# Patient Record
Sex: MALE | Race: WHITE | NOT HISPANIC OR LATINO | URBAN - METROPOLITAN AREA
[De-identification: names, ages, dates, MRNs, and addresses within clinical notes are randomized per-mention and may not be internally consistent; named-entity substitution may affect disease eponyms.]

---

## 2017-06-15 ENCOUNTER — TELEPHONE (OUTPATIENT)
Dept: FAMILY MEDICINE CLINIC | Facility: CLINIC | Age: 47
End: 2017-06-15

## 2017-06-15 NOTE — TELEPHONE ENCOUNTER
----- Message from Nancy Quiroz sent at 6/15/2017 10:43 AM EDT -----  Contact: PATIENT  HE GOT A SAMPLE PACK OF TESTOSTERONE BOOSTER IN THE MAIL ALPHA X BOOST, HE WANTED TO KNOW IF HE'S OK TO TAKE IT WITH THE citalopram (CeleXA) 20 MG. PLEASE CALL HIM BACK ON CELL NUMBER [phone]     THANK YOU

## 2018-04-04 RX ORDER — CITALOPRAM 20 MG/1
TABLET ORAL
Qty: 30 TABLET | Refills: 9 | OUTPATIENT
Start: 2018-04-04

## 2023-10-19 ENCOUNTER — LAB (OUTPATIENT)
Dept: LAB | Facility: HOSPITAL | Age: 53
End: 2023-10-19
Payer: COMMERCIAL

## 2023-10-19 ENCOUNTER — OFFICE VISIT (OUTPATIENT)
Dept: FAMILY MEDICINE CLINIC | Facility: CLINIC | Age: 53
End: 2023-10-19
Payer: COMMERCIAL

## 2023-10-19 VITALS
BODY MASS INDEX: 29.54 KG/M2 | OXYGEN SATURATION: 98 % | HEART RATE: 98 BPM | SYSTOLIC BLOOD PRESSURE: 122 MMHG | DIASTOLIC BLOOD PRESSURE: 80 MMHG | WEIGHT: 211 LBS | HEIGHT: 71 IN

## 2023-10-19 DIAGNOSIS — Z76.89 ENCOUNTER TO ESTABLISH CARE: Primary | ICD-10-CM

## 2023-10-19 DIAGNOSIS — Z13.0 SCREENING FOR DEFICIENCY ANEMIA: ICD-10-CM

## 2023-10-19 DIAGNOSIS — F32.A DEPRESSION, UNSPECIFIED DEPRESSION TYPE: ICD-10-CM

## 2023-10-19 DIAGNOSIS — Z12.5 SCREENING FOR MALIGNANT NEOPLASM OF PROSTATE: ICD-10-CM

## 2023-10-19 DIAGNOSIS — R06.09 DYSPNEA ON EXERTION: ICD-10-CM

## 2023-10-19 DIAGNOSIS — Z11.4 SCREENING FOR HIV (HUMAN IMMUNODEFICIENCY VIRUS): ICD-10-CM

## 2023-10-19 DIAGNOSIS — I51.7 ENLARGED HEART: ICD-10-CM

## 2023-10-19 DIAGNOSIS — Z13.220 SCREENING FOR LIPID DISORDERS: ICD-10-CM

## 2023-10-19 DIAGNOSIS — Z11.59 ENCOUNTER FOR HCV SCREENING TEST FOR LOW RISK PATIENT: ICD-10-CM

## 2023-10-19 DIAGNOSIS — I20.89 ANGINA OF EFFORT: ICD-10-CM

## 2023-10-19 LAB
ALBUMIN SERPL-MCNC: 3.9 G/DL (ref 3.5–5.2)
ALBUMIN/GLOB SERPL: 1.7 G/DL
ALP SERPL-CCNC: 66 U/L (ref 39–117)
ALT SERPL W P-5'-P-CCNC: 66 U/L (ref 1–41)
ANION GAP SERPL CALCULATED.3IONS-SCNC: 9.5 MMOL/L (ref 5–15)
AST SERPL-CCNC: 41 U/L (ref 1–40)
BILIRUB SERPL-MCNC: 2.3 MG/DL (ref 0–1.2)
BUN SERPL-MCNC: 18 MG/DL (ref 6–20)
BUN/CREAT SERPL: 11.9 (ref 7–25)
CALCIUM SPEC-SCNC: 9 MG/DL (ref 8.6–10.5)
CHLORIDE SERPL-SCNC: 106 MMOL/L (ref 98–107)
CO2 SERPL-SCNC: 23.5 MMOL/L (ref 22–29)
CREAT SERPL-MCNC: 1.51 MG/DL (ref 0.76–1.27)
DEPRECATED RDW RBC AUTO: 40.3 FL (ref 37–54)
EGFRCR SERPLBLD CKD-EPI 2021: 55.9 ML/MIN/1.73
ERYTHROCYTE [DISTWIDTH] IN BLOOD BY AUTOMATED COUNT: 12.1 % (ref 12.3–15.4)
GLOBULIN UR ELPH-MCNC: 2.3 GM/DL
GLUCOSE SERPL-MCNC: 157 MG/DL (ref 65–99)
HCT VFR BLD AUTO: 47.8 % (ref 37.5–51)
HGB BLD-MCNC: 16.6 G/DL (ref 13–17.7)
MCH RBC QN AUTO: 32 PG (ref 26.6–33)
MCHC RBC AUTO-ENTMCNC: 34.7 G/DL (ref 31.5–35.7)
MCV RBC AUTO: 92.1 FL (ref 79–97)
NT-PROBNP SERPL-MCNC: 2765 PG/ML (ref 0–900)
PLATELET # BLD AUTO: 229 10*3/MM3 (ref 140–450)
PMV BLD AUTO: 12 FL (ref 6–12)
POTASSIUM SERPL-SCNC: 4.7 MMOL/L (ref 3.5–5.2)
PROT SERPL-MCNC: 6.2 G/DL (ref 6–8.5)
PSA SERPL-MCNC: 1.41 NG/ML (ref 0–4)
RBC # BLD AUTO: 5.19 10*6/MM3 (ref 4.14–5.8)
SODIUM SERPL-SCNC: 139 MMOL/L (ref 136–145)
TSH SERPL DL<=0.05 MIU/L-ACNC: 2.49 UIU/ML (ref 0.27–4.2)
WBC NRBC COR # BLD: 7.73 10*3/MM3 (ref 3.4–10.8)

## 2023-10-19 PROCEDURE — 83880 ASSAY OF NATRIURETIC PEPTIDE: CPT | Performed by: STUDENT IN AN ORGANIZED HEALTH CARE EDUCATION/TRAINING PROGRAM

## 2023-10-19 PROCEDURE — 36415 COLL VENOUS BLD VENIPUNCTURE: CPT | Performed by: STUDENT IN AN ORGANIZED HEALTH CARE EDUCATION/TRAINING PROGRAM

## 2023-10-19 PROCEDURE — 86803 HEPATITIS C AB TEST: CPT | Performed by: STUDENT IN AN ORGANIZED HEALTH CARE EDUCATION/TRAINING PROGRAM

## 2023-10-19 PROCEDURE — 85027 COMPLETE CBC AUTOMATED: CPT | Performed by: STUDENT IN AN ORGANIZED HEALTH CARE EDUCATION/TRAINING PROGRAM

## 2023-10-19 PROCEDURE — 80053 COMPREHEN METABOLIC PANEL: CPT | Performed by: STUDENT IN AN ORGANIZED HEALTH CARE EDUCATION/TRAINING PROGRAM

## 2023-10-19 PROCEDURE — 84443 ASSAY THYROID STIM HORMONE: CPT | Performed by: STUDENT IN AN ORGANIZED HEALTH CARE EDUCATION/TRAINING PROGRAM

## 2023-10-19 PROCEDURE — G0103 PSA SCREENING: HCPCS | Performed by: STUDENT IN AN ORGANIZED HEALTH CARE EDUCATION/TRAINING PROGRAM

## 2023-10-19 PROCEDURE — G0432 EIA HIV-1/HIV-2 SCREEN: HCPCS | Performed by: STUDENT IN AN ORGANIZED HEALTH CARE EDUCATION/TRAINING PROGRAM

## 2023-10-19 RX ORDER — CITALOPRAM 20 MG/1
20 TABLET ORAL DAILY
Qty: 30 TABLET | Refills: 2 | Status: SHIPPED | OUTPATIENT
Start: 2023-10-19

## 2023-10-19 NOTE — PROGRESS NOTES
New Patient Office Visit      Date: 10/19/2023   Patient Name: Bj Pichardo  : 1970   MRN: [id number]     Chief Complaint:    Chief Complaint   Patient presents with    Shortness of Breath     Symptoms have being going on for about 2.5 weeks, having trouble sleeping        History of Present Illness: Bj Pichardo is a 52 y.o. male who is here today to establish care.      Subjective      HPI:  Patient presents today to establish care.  Has not seen a physician in several years.    Started feeling SOB about a month ago.  Thought it was a cold at first, started taking Theraflu. Symptoms then started progressing, now feels SOB and experiencing chest tightness with walking anywhere form 30 to 50 feet. He is fairly active with this work. He denies any cough, fever. He was seen at Urgent Care on 10/16 and treated for URI with Amoxicillin and albuterol. CXR at that time showed no airspace disease but there was concern for enlarged heart, no pleural effusions were noted.   He denies any known cardiac history. He had been feeling overall in good health up until the last month. He does admit to extensive alcohol use on the weekends starting during pandemic but denies daily drinking or any h/o alcohol withdrawal. States he knew he was drinking too much so he cut back and has not had any alcohol in over 2 months. Denies LE swelling, palpitations, syncopal episode. No h/o smoking.     Would also like to discuss depression. Was started on Celexa a few years ago at time of his second divorce and recalls this helping. Stopped taking it because he thought he was in a better place. He now feels low motivation, low energy. Not finding any interest in things.     Review of Systems:   Negative/not pertinent unless otherwise noted above in HPI.     Past Medical History:   Past Medical History:   Diagnosis Date    Kidney stone        Past Surgical History: History reviewed. No pertinent surgical history.    Family History:    Family History   Problem Relation Age of Onset    Arthritis Mother     Arthritis Father     Heart attack Father     Ankylosing spondylitis Brother        Social History:   Social History     Socioeconomic History    Marital status: Single   Tobacco Use    Smoking status: Never     Passive exposure: Never    Smokeless tobacco: Never   Vaping Use    Vaping Use: Never used   Substance and Sexual Activity    Alcohol use: Not Currently    Drug use: Not Currently     Types: Marijuana     Comment: Rare    Sexual activity: Defer       Medications:     Current Outpatient Medications:     albuterol sulfate  (90 Base) MCG/ACT inhaler, Inhale 2 puffs Every 4 (Four) Hours As Needed for Wheezing for up to 30 days., Disp: 6.7 g, Rfl: 0    amoxicillin (AMOXIL) 500 MG capsule, Take 2 capsules by mouth 2 (Two) Times a Day for 10 days., Disp: 40 capsule, Rfl: 0    hydrOXYzine (ATARAX) 25 MG tablet, Take 1 tablet by mouth 3 (Three) Times a Day As Needed for Anxiety., Disp: 20 tablet, Rfl: 0    citalopram (CeleXA) 20 MG tablet, Take 1 tablet by mouth Daily., Disp: 30 tablet, Rfl: 2    Allergies:   No Known Allergies    Immunizations:  Immunization History   Administered Date(s) Administered    COVID-19 (PFIZER) Purple Cap Monovalent 03/01/2021, 03/29/2021     Hep C (Age 18-79 once):  ordered  HIV (Age 15-65 once): ordered  A1c: ordered  Lipid panel: plan for follow up    Tobacco Use: Low Risk  (10/19/2023)    Patient History     Smoking Tobacco Use: Never     Smokeless Tobacco Use: Never     Passive Exposure: Never       Social History     Substance and Sexual Activity   Alcohol Use Not Currently        Social History     Substance and Sexual Activity   Drug Use Not Currently    Types: Marijuana    Comment: Rare        Diet/Physical activity: Not healthy, attempting to improve. counseled on 10/20/23    PHQ-2 Depression Screening  Little interest or pleasure in doing things? 0-->not at all   Feeling down, depressed, or hopeless?  "1-->several days   PHQ-2 Total Score 1     PHQ-9 Total Score: 1       Objective     Physical Exam:  Vital Signs:   Vitals:    10/19/23 1121   BP: 122/80   BP Location: Left arm   Patient Position: Sitting   Cuff Size: Adult   Pulse: 98   SpO2: 98%   Weight: 95.7 kg (211 lb)   Height: 180.3 cm (71\")     Body mass index is 29.43 kg/m².    Physical Exam  Vitals reviewed.   Constitutional:       General: He is not in acute distress.     Appearance: Normal appearance. He is normal weight.   HENT:      Head: Normocephalic and atraumatic.      Right Ear: Tympanic membrane normal.      Left Ear: Tympanic membrane normal.      Nose: Nose normal.      Mouth/Throat:      Mouth: Mucous membranes are moist.      Pharynx: Oropharynx is clear. No oropharyngeal exudate or posterior oropharyngeal erythema.   Eyes:      Extraocular Movements: Extraocular movements intact.      Conjunctiva/sclera: Conjunctivae normal.      Pupils: Pupils are equal, round, and reactive to light.   Cardiovascular:      Rate and Rhythm: Normal rate and regular rhythm.      Pulses: Normal pulses.      Heart sounds: Normal heart sounds. No murmur heard.  Pulmonary:      Effort: Pulmonary effort is normal.      Breath sounds: No wheezing, rhonchi or rales.   Abdominal:      General: Abdomen is flat. Bowel sounds are normal.      Palpations: Abdomen is soft. There is no mass.      Tenderness: There is no abdominal tenderness.      Hernia: A hernia (umbilical) is present.   Musculoskeletal:         General: Normal range of motion.      Cervical back: Normal range of motion and neck supple.      Right lower leg: No edema.      Left lower leg: No edema.   Lymphadenopathy:      Cervical: No cervical adenopathy.   Neurological:      General: No focal deficit present.      Mental Status: He is alert.   Psychiatric:         Mood and Affect: Mood normal.         Thought Content: Thought content normal.         Procedures  EKG- Shows inferior lead and septal Q waves " in comparison to last available EKG 2016      Assessment / Plan      Assessment/Plan:   Diagnoses and all orders for this visit:    1. Encounter to establish care (Primary)  -     CBC (No Diff); Future  -     Comprehensive Metabolic Panel; Future  -     TSH; Future  -     CBC (No Diff)  -     Comprehensive Metabolic Panel  -     TSH    2. Dyspnea on exertion    3. Angina of effort  -     Stress Test With Myocardial Perfusion One Day; Future  -     Ambulatory Referral to Cardiology    4. Enlarged heart  -     CBC (No Diff); Future  -     Comprehensive Metabolic Panel; Future  -     TSH; Future  -     proBNP; Future  -     Adult Transthoracic Echo Complete W/ Cont if Necessary Per Protocol; Future  -     ECG 12 Lead  -     CBC (No Diff)  -     Comprehensive Metabolic Panel  -     TSH  -     proBNP  -     Stress Test With Myocardial Perfusion One Day; Future  -     Ambulatory Referral to Cardiology    5. Depression, unspecified depression type  -     citalopram (CeleXA) 20 MG tablet; Take 1 tablet by mouth Daily.  Dispense: 30 tablet; Refill: 2    6. Screening for malignant neoplasm of prostate  -     PSA Screen; Future  -     PSA Screen    7. Screening for lipid disorders    8. Screening for deficiency anemia  -     CBC (No Diff); Future  -     CBC (No Diff)    9. Encounter for HCV screening test for low risk patient  -     Hepatitis C Antibody; Future  -     Hepatitis C Antibody    10. Screening for HIV (human immunodeficiency virus)  -     HIV-1 / O / 2 Ag / Antibody; Future  -     HIV-1 / O / 2 Ag / Antibody      Dyspnea/Chest Tightness on Exertion  Symptoms x 1 month c/w possible NYHA Class 2 HF, stable angina on exertion. There is evidence of cardiomegaly on recent Xray. Difficult to tell whether his symptoms are ischemic in nature or 2/2 cardiomyopathy. He denies any symptoms at rest. There is notable Q waves in inferior and septal leads. Will pursue further evaluation with echo and stress testing. Urgent  referral to Cardiology placed.  BNP resulted on 10/20 elevated at 2,000 (unknown baseline). Will start daily Lasix 40mg and ASA 81mg.   Patient was counseled extensively on reasons to report to ER if symptoms worsen or begin happening with rest.   Plan for fasting lipid panel at follow up    Depression  Start Celexa 20mg daily  Recheck at FU    Healthcare Maintenance:  Counseling provided based on age appropriate USPSTF guidelines.  BMI is >= 25 and <30. (Overweight) The following options were offered after discussion;: exercise counseling/recommendations and nutrition counseling/recommendations    Bj Marino voices understanding and acceptance of this advice and will call back with any further questions or concerns. AVS with preventive healthcare tips printed for patient.         Follow Up:   Return in about 4 weeks (around 11/16/2023) for Recheck 30 min appt w Dr. Hilton, wants to establish with him.  Has appt next week w Dr. Yobani Kern, DO  [facility]

## 2023-10-20 ENCOUNTER — PATIENT ROUNDING (BHMG ONLY) (OUTPATIENT)
Dept: FAMILY MEDICINE CLINIC | Facility: CLINIC | Age: 53
End: 2023-10-20
Payer: COMMERCIAL

## 2023-10-20 LAB
HCV AB SER DONR QL: NORMAL
HIV 1+2 AB+HIV1 P24 AG SERPL QL IA: NORMAL

## 2023-10-20 RX ORDER — FUROSEMIDE 40 MG/1
40 TABLET ORAL DAILY
Qty: 10 TABLET | Refills: 0 | Status: SHIPPED | OUTPATIENT
Start: 2023-10-20

## 2023-10-23 ENCOUNTER — PATIENT MESSAGE (OUTPATIENT)
Dept: FAMILY MEDICINE CLINIC | Facility: CLINIC | Age: 53
End: 2023-10-23
Payer: COMMERCIAL

## 2023-10-23 ENCOUNTER — TELEPHONE (OUTPATIENT)
Dept: FAMILY MEDICINE CLINIC | Facility: CLINIC | Age: 53
End: 2023-10-23
Payer: COMMERCIAL

## 2023-10-23 DIAGNOSIS — I42.0 DILATED CARDIOMYOPATHY: ICD-10-CM

## 2023-10-23 DIAGNOSIS — R06.09 DYSPNEA ON EXERTION: Primary | ICD-10-CM

## 2023-10-23 NOTE — TELEPHONE ENCOUNTER
Caller: YNES MONTGOMERY    Relationship: Emergency Contact    Best call back number: [phone]     What is the best time to reach you: ANY    Who are you requesting to speak with (clinical staff, provider,  specific staff member): REFERRAL COORDINATOR    Do you know the name of the person who called: SELF    What was the call regarding: PATIENT'S SISTER CALLED ABOUT STRESS TEST.    Is it okay if the provider responds through MyChart: NO

## 2023-10-23 NOTE — TELEPHONE ENCOUNTER
Called pt and let him know his stress test was sent to [facility]. Gave pt phone number to Central Scheduling.

## 2023-10-23 NOTE — TELEPHONE ENCOUNTER
Hub staff attempted to follow warm transfer process and was unsuccessful     Caller: Bj Pichardo    Relationship to patient: Self    Best call back number: [phone]    Patient is needing: AN UPDATE ON REFERRAL FOR CARDIOLOGIST

## 2023-10-27 ENCOUNTER — TELEPHONE (OUTPATIENT)
Dept: FAMILY MEDICINE CLINIC | Facility: CLINIC | Age: 53
End: 2023-10-27
Payer: COMMERCIAL

## 2023-10-27 ENCOUNTER — TELEPHONE (OUTPATIENT)
Dept: CARDIOLOGY | Facility: CLINIC | Age: 53
End: 2023-10-27

## 2023-10-27 ENCOUNTER — HOSPITAL ENCOUNTER (OUTPATIENT)
Dept: CARDIOLOGY | Facility: HOSPITAL | Age: 53
Discharge: HOME | End: 2023-10-27
Payer: COMMERCIAL

## 2023-10-27 VITALS — BODY MASS INDEX: 29.54 KG/M2 | HEIGHT: 71 IN | WEIGHT: 210.98 LBS

## 2023-10-27 DIAGNOSIS — I31.39 PERICARDIAL EFFUSION: ICD-10-CM

## 2023-10-27 DIAGNOSIS — I51.7 ENLARGED HEART: ICD-10-CM

## 2023-10-27 DIAGNOSIS — I50.20 HEART FAILURE WITH REDUCED EJECTION FRACTION: ICD-10-CM

## 2023-10-27 DIAGNOSIS — R06.09 DYSPNEA ON EXERTION: Primary | ICD-10-CM

## 2023-10-27 LAB
BH CV ECHO MEAS - AO ROOT DIAM: 2.8 CM
BH CV ECHO MEAS - EDV(CUBED): 361.2 ML
BH CV ECHO MEAS - EDV(MOD-SP2): 202 ML
BH CV ECHO MEAS - EDV(MOD-SP4): 209 ML
BH CV ECHO MEAS - EF(MOD-BP): 10 %
BH CV ECHO MEAS - EF(MOD-SP2): 10.4 %
BH CV ECHO MEAS - EF(MOD-SP4): 10.5 %
BH CV ECHO MEAS - ESV(CUBED): 313.4 ML
BH CV ECHO MEAS - ESV(MOD-SP2): 181 ML
BH CV ECHO MEAS - ESV(MOD-SP4): 187 ML
BH CV ECHO MEAS - FS: 4.6 %
BH CV ECHO MEAS - IVS/LVPW: 1.05 CM
BH CV ECHO MEAS - IVSD: 0.79 CM
BH CV ECHO MEAS - LAT PEAK E' VEL: 2.8 CM/SEC
BH CV ECHO MEAS - LV DIASTOLIC VOL/BSA (35-75): 96.9 CM2
BH CV ECHO MEAS - LV MASS(C)D: 241.2 GRAMS
BH CV ECHO MEAS - LV SYSTOLIC VOL/BSA (12-30): 86.7 CM2
BH CV ECHO MEAS - LVIDD: 7.1 CM
BH CV ECHO MEAS - LVIDS: 6.8 CM
BH CV ECHO MEAS - LVOT AREA: 3.1 CM2
BH CV ECHO MEAS - LVOT DIAM: 1.98 CM
BH CV ECHO MEAS - LVPWD: 0.75 CM
BH CV ECHO MEAS - MED PEAK E' VEL: 3.1 CM/SEC
BH CV ECHO MEAS - MR MAX PG: 72.1 MMHG
BH CV ECHO MEAS - MR MAX VEL: 424.3 CM/SEC
BH CV ECHO MEAS - MR MEAN PG: 44.9 MMHG
BH CV ECHO MEAS - MR MEAN VEL: 310.8 CM/SEC
BH CV ECHO MEAS - MR VTI: 120.4 CM
BH CV ECHO MEAS - MV A MAX VEL: 42 CM/SEC
BH CV ECHO MEAS - MV DEC SLOPE: 382.6 CM/SEC2
BH CV ECHO MEAS - MV DEC TIME: 0.39 SEC
BH CV ECHO MEAS - MV E MAX VEL: 63.7 CM/SEC
BH CV ECHO MEAS - MV E/A: 1.52
BH CV ECHO MEAS - MV P1/2T: 78 MSEC
BH CV ECHO MEAS - MVA(P1/2T): 2.8 CM2
BH CV ECHO MEAS - PA ACC SLOPE: 439.7 CM/SEC2
BH CV ECHO MEAS - PA ACC TIME: 0.08 SEC
BH CV ECHO MEAS - PI END-D VEL: 153.4 CM/SEC
BH CV ECHO MEAS - RAP SYSTOLE: 15 MMHG
BH CV ECHO MEAS - RVDD: 3.6 CM
BH CV ECHO MEAS - RVSP: 63.7 MMHG
BH CV ECHO MEAS - SI(MOD-SP2): 9.7 ML/M2
BH CV ECHO MEAS - SI(MOD-SP4): 10.2 ML/M2
BH CV ECHO MEAS - SV(MOD-SP2): 21 ML
BH CV ECHO MEAS - SV(MOD-SP4): 22 ML
BH CV ECHO MEAS - TAPSE (>1.6): 1.4 CM
BH CV ECHO MEAS - TR MAX PG: 48.7 MMHG
BH CV ECHO MEAS - TR MAX VEL: 348.8 CM/SEC
BH CV ECHO MEASUREMENTS AVERAGE E/E' RATIO: 21.59
BH CV VAS BP LEFT ARM: NORMAL MMHG
BH CV XLRA - RV BASE: 5.1 CM
BH CV XLRA - RV LENGTH: 8.7 CM
BH CV XLRA - RV MID: 4.2 CM
BH CV XLRA - TDI S': 5.53 CM/SEC
IVRT: 204 MS
LEFT ATRIUM VOLUME INDEX: 54.2 ML/M2
LEFT ATRIUM VOLUME: 117 ML

## 2023-10-27 PROCEDURE — 93306 TTE W/DOPPLER COMPLETE: CPT

## 2023-10-27 NOTE — TELEPHONE ENCOUNTER
Caller: Bj Pichardo    Relationship to patient: Self    Best call back number: [phone]    Chief complaint:     Type of visit: NEW PATIENT    Requested date: ASAP     If rescheduling, when is the original appointment: NONE     Additional notes:[facility] HAS NO OPENINGS TO SCHEDULE PATIENT FROM REFERRAL WITH DR. RAO. PLEASE CONTACT THE PATIENT ASAP TO SCHEDULE

## 2023-10-27 NOTE — TELEPHONE ENCOUNTER
Spoke with patient to relay results over the phone. STAT referral to Cardiology is pending, sent new referral to [facility]. Awaiting appt. Will continue lasix and start low dose Losartan. Hold off on BB for now until he is seen by Cardiology.     Pt reports that his symptoms have been much improved since starting on Lasix. Again reviewed strict ER precautions if symptoms worsen at all.

## 2023-10-31 NOTE — TELEPHONE ENCOUNTER
From: Olesya Kern  To: Bj Pichardo  Sent: 10/23/2023 2:03 PM EDT  Subject: Cardiology Appointment    Timo Lorenzo,     I hope you're doing well. I wanted to follow up and see if you had been able to schedule with the Cardiology office yet? I see your stress test was scheduled already.    Best,  Olesya Kern, DO

## 2023-11-01 ENCOUNTER — HOSPITAL ENCOUNTER (OUTPATIENT)
Dept: CARDIOLOGY | Facility: HOSPITAL | Age: 53
Discharge: HOME | End: 2023-11-01
Payer: COMMERCIAL

## 2023-11-01 DIAGNOSIS — I51.7 ENLARGED HEART: ICD-10-CM

## 2023-11-01 DIAGNOSIS — I20.89 ANGINA OF EFFORT: ICD-10-CM

## 2023-11-01 PROBLEM — I50.20 HFREF (HEART FAILURE WITH REDUCED EJECTION FRACTION): Status: ACTIVE | Noted: 2023-11-01

## 2023-11-01 PROCEDURE — 93017 CV STRESS TEST TRACING ONLY: CPT

## 2023-11-01 PROCEDURE — 78452 HT MUSCLE IMAGE SPECT MULT: CPT

## 2023-11-01 PROCEDURE — 25010000002 REGADENOSON 0.4 MG/5ML SOLUTION: Performed by: STUDENT IN AN ORGANIZED HEALTH CARE EDUCATION/TRAINING PROGRAM

## 2023-11-01 PROCEDURE — A9500 TC99M SESTAMIBI: HCPCS | Performed by: STUDENT IN AN ORGANIZED HEALTH CARE EDUCATION/TRAINING PROGRAM

## 2023-11-01 PROCEDURE — 0 TECHNETIUM SESTAMIBI: Performed by: STUDENT IN AN ORGANIZED HEALTH CARE EDUCATION/TRAINING PROGRAM

## 2023-11-01 RX ORDER — REGADENOSON 0.08 MG/ML
0.4 INJECTION, SOLUTION INTRAVENOUS ONCE
Status: COMPLETED | OUTPATIENT
Start: 2023-11-01 | End: 2023-11-01

## 2023-11-01 RX ORDER — ASPIRIN 81 MG/1
81 TABLET, CHEWABLE ORAL DAILY
COMMUNITY

## 2023-11-01 RX ADMIN — REGADENOSON 0.4 MG: 0.08 INJECTION, SOLUTION INTRAVENOUS at 09:35

## 2023-11-01 RX ADMIN — TECHNETIUM TC 99M SESTAMIBI 1 DOSE: 1 INJECTION INTRAVENOUS at 07:52

## 2023-11-01 RX ADMIN — TECHNETIUM TC 99M SESTAMIBI 1 DOSE: 1 INJECTION INTRAVENOUS at 09:35

## 2023-11-02 ENCOUNTER — TELEPHONE (OUTPATIENT)
Dept: CARDIOLOGY | Facility: CLINIC | Age: 53
End: 2023-11-02

## 2023-11-02 ENCOUNTER — OFFICE VISIT (OUTPATIENT)
Dept: CARDIOLOGY | Facility: CLINIC | Age: 53
End: 2023-11-02
Payer: COMMERCIAL

## 2023-11-02 VITALS
OXYGEN SATURATION: 99 % | HEART RATE: 76 BPM | SYSTOLIC BLOOD PRESSURE: 116 MMHG | WEIGHT: 199 LBS | BODY MASS INDEX: 27.86 KG/M2 | DIASTOLIC BLOOD PRESSURE: 78 MMHG | HEIGHT: 71 IN

## 2023-11-02 DIAGNOSIS — I51.9 SYSTOLIC DYSFUNCTION: ICD-10-CM

## 2023-11-02 DIAGNOSIS — I49.3 PVC (PREMATURE VENTRICULAR CONTRACTION): Primary | ICD-10-CM

## 2023-11-02 RX ORDER — CARVEDILOL 6.25 MG/1
6.25 TABLET ORAL 2 TIMES DAILY
Qty: 180 TABLET | Refills: 3 | Status: SHIPPED | OUTPATIENT
Start: 2023-11-02

## 2023-11-02 RX ORDER — DAPAGLIFLOZIN 10 MG/1
10 TABLET, FILM COATED ORAL DAILY
Qty: 90 TABLET | Refills: 3 | Status: SHIPPED | OUTPATIENT
Start: 2023-11-02

## 2023-11-02 NOTE — PROGRESS NOTES
"Chief Complaint  Consult      Subjective   History of Present Illness    Problem List  -compensated severe systolic dysfunction, etiology unknown but likely idiopathic dilated cardiomyopathy  -prior heavy drinking  -family hx of CAD  -EKG, frequent PVCs, echo LVEF 20%, nuclear scan pending    Mr. Pichardo is a 52 year old man with above hx.  Was a little short of breath.  CXR showed cardiomegaly and echo showed severe systolic dysfunction. Placed on diuretic and symptoms resolved.  Was drinking heavily but quit a few weeks ago.  ROS negative except for the above.           Objective   Vital Signs:  Vitals:    11/02/23 0943   BP: 116/78   Pulse: 76   SpO2: 99%     Estimated body mass index is 27.75 kg/m² as calculated from the following:    Height as of this encounter: 180.3 cm (71\").    Weight as of this encounter: 90.3 kg (199 lb).       Physical Exam  HENT:      Head: Normocephalic.   Eyes:      Extraocular Movements: Extraocular movements intact.   Cardiovascular:      Rate and Rhythm: Normal rate and regular rhythm.      Heart sounds: No murmur heard.     No gallop.   Pulmonary:      Breath sounds: Normal breath sounds.   Abdominal:      Palpations: Abdomen is soft.   Musculoskeletal:      Right lower leg: No edema.      Left lower leg: No edema.   Skin:     General: Skin is warm and dry.   Neurological:      General: No focal deficit present.      Mental Status: He is alert.   Psychiatric:         Mood and Affect: Mood normal.           ECG 12 Lead    Date/Time: 11/2/2023 11:02 AM  Performed by: Vega Yoder MD    Authorized by: Vega oYder MD  Rhythm: sinus rhythm  Ectopy: unifocal PVCs           Assessment   -compensated severe systolic dysfunction, etiology unknown but likely idiopathic dilated cardiomyopathy  -prior heavy drinking  -family hx of CAD  -EKG, frequent PVCs, echo LVEF 20%, nuclear scan pending    Plan   -on losartan and aspirin  -start coreg and farxiga  -blood work and " holter to monitor PVCs  -nuclear scan pending but will eventually need LHC/RHC or coronary CT angiogram  -1-2 week follow up      Vega Yoder MD  11/02/2023 11:02 EDT

## 2023-11-02 NOTE — TELEPHONE ENCOUNTER
Received call from patient's sister regarding price of Farxiga at pharmacy. Returned call and informed patient of patient assistance forms that will be mailed to him and will need to be returned to the office. Patient agreeable to plan and questions answered at this time.

## 2023-11-03 LAB
BH CV REST NUCLEAR ISOTOPE DOSE: 9.9 MCI
BH CV STRESS BP STAGE 2: NORMAL
BH CV STRESS BP STAGE 4: NORMAL
BH CV STRESS COMMENTS STAGE 1: NORMAL
BH CV STRESS DOSE REGADENOSON STAGE 1: 0.4
BH CV STRESS DURATION MIN STAGE 1: 1
BH CV STRESS DURATION MIN STAGE 2: 1
BH CV STRESS DURATION MIN STAGE 3: 1
BH CV STRESS DURATION MIN STAGE 4: 1
BH CV STRESS DURATION SEC STAGE 1: 0
BH CV STRESS DURATION SEC STAGE 2: 0
BH CV STRESS DURATION SEC STAGE 3: 0
BH CV STRESS DURATION SEC STAGE 4: 0
BH CV STRESS HR STAGE 1: 76
BH CV STRESS HR STAGE 2: 96
BH CV STRESS HR STAGE 3: 83
BH CV STRESS HR STAGE 4: 81
BH CV STRESS NUCLEAR ISOTOPE DOSE: 32.1 MCI
BH CV STRESS O2 STAGE 1: 99
BH CV STRESS O2 STAGE 2: 99
BH CV STRESS O2 STAGE 3: 100
BH CV STRESS O2 STAGE 4: 100
BH CV STRESS PROTOCOL 1: NORMAL
BH CV STRESS RECOVERY BP: NORMAL MMHG
BH CV STRESS RECOVERY HR: 80 BPM
BH CV STRESS RECOVERY O2: 100 %
BH CV STRESS STAGE 1: 1
BH CV STRESS STAGE 2: 2
BH CV STRESS STAGE 3: 3
BH CV STRESS STAGE 4: 4
LV EF NUC BP: 19 %
MAXIMAL PREDICTED HEART RATE: 170 BPM
PERCENT MAX PREDICTED HR: 57.65 %
STRESS BASELINE BP: NORMAL MMHG
STRESS BASELINE HR: 88 BPM
STRESS O2 SAT REST: 99 %
STRESS PERCENT HR: 68 %
STRESS POST ESTIMATED WORKLOAD: 1 METS
STRESS POST EXERCISE DUR MIN: 4 MIN
STRESS POST EXERCISE DUR SEC: 0 SEC
STRESS POST O2 SAT PEAK: 100 %
STRESS POST PEAK BP: NORMAL MMHG
STRESS POST PEAK HR: 98 BPM
STRESS TARGET HR: 145 BPM

## 2023-11-12 ENCOUNTER — APPOINTMENT (OUTPATIENT)
Dept: GENERAL RADIOLOGY | Facility: HOSPITAL | Age: 53
End: 2023-11-12
Payer: COMMERCIAL

## 2023-11-12 ENCOUNTER — HOSPITAL ENCOUNTER (EMERGENCY)
Facility: HOSPITAL | Age: 53
Discharge: HOME | End: 2023-11-12
Attending: STUDENT IN AN ORGANIZED HEALTH CARE EDUCATION/TRAINING PROGRAM | Admitting: STUDENT IN AN ORGANIZED HEALTH CARE EDUCATION/TRAINING PROGRAM
Payer: COMMERCIAL

## 2023-11-12 ENCOUNTER — APPOINTMENT (OUTPATIENT)
Dept: CT IMAGING | Facility: HOSPITAL | Age: 53
End: 2023-11-12
Payer: COMMERCIAL

## 2023-11-12 VITALS
TEMPERATURE: 98.2 F | HEART RATE: 73 BPM | HEIGHT: 71 IN | SYSTOLIC BLOOD PRESSURE: 102 MMHG | RESPIRATION RATE: 18 BRPM | WEIGHT: 199 LBS | DIASTOLIC BLOOD PRESSURE: 67 MMHG | OXYGEN SATURATION: 97 % | BODY MASS INDEX: 27.86 KG/M2

## 2023-11-12 DIAGNOSIS — I50.9 ACUTE ON CHRONIC CONGESTIVE HEART FAILURE, UNSPECIFIED HEART FAILURE TYPE: Primary | ICD-10-CM

## 2023-11-12 DIAGNOSIS — R06.00 DYSPNEA, UNSPECIFIED TYPE: ICD-10-CM

## 2023-11-12 DIAGNOSIS — I50.20 HFREF (HEART FAILURE WITH REDUCED EJECTION FRACTION): ICD-10-CM

## 2023-11-12 DIAGNOSIS — R79.89 ELEVATED BRAIN NATRIURETIC PEPTIDE (BNP) LEVEL: ICD-10-CM

## 2023-11-12 LAB
ALBUMIN SERPL-MCNC: 3.8 G/DL (ref 3.5–5.2)
ALBUMIN/GLOB SERPL: 1.8 G/DL
ALP SERPL-CCNC: 91 U/L (ref 39–117)
ALT SERPL W P-5'-P-CCNC: 50 U/L (ref 1–41)
ANION GAP SERPL CALCULATED.3IONS-SCNC: 9 MMOL/L (ref 5–15)
AST SERPL-CCNC: 30 U/L (ref 1–40)
ATMOSPHERIC PRESS: ABNORMAL MM[HG]
B PARAPERT DNA SPEC QL NAA+PROBE: NOT DETECTED
B PERT DNA SPEC QL NAA+PROBE: NOT DETECTED
BASE EXCESS BLDV CALC-SCNC: 3.7 MMOL/L (ref -2–2)
BASOPHILS # BLD AUTO: 0.04 10*3/MM3 (ref 0–0.2)
BASOPHILS NFR BLD AUTO: 0.5 % (ref 0–1.5)
BDY SITE: ABNORMAL
BILIRUB SERPL-MCNC: 1.3 MG/DL (ref 0–1.2)
BODY TEMPERATURE: 37 C
BUN SERPL-MCNC: 13 MG/DL (ref 6–20)
BUN/CREAT SERPL: 10.5 (ref 7–25)
C PNEUM DNA NPH QL NAA+NON-PROBE: NOT DETECTED
CALCIUM SPEC-SCNC: 8.8 MG/DL (ref 8.6–10.5)
CHLORIDE SERPL-SCNC: 111 MMOL/L (ref 98–107)
CO2 BLDA-SCNC: 31.6 MMOL/L (ref 22–33)
CO2 SERPL-SCNC: 23 MMOL/L (ref 22–29)
COHGB MFR BLD: 1 %
CREAT SERPL-MCNC: 1.24 MG/DL (ref 0.76–1.27)
DEPRECATED RDW RBC AUTO: 41.4 FL (ref 37–54)
EGFRCR SERPLBLD CKD-EPI 2021: 70.8 ML/MIN/1.73
EOSINOPHIL # BLD AUTO: 0.12 10*3/MM3 (ref 0–0.4)
EOSINOPHIL NFR BLD AUTO: 1.4 % (ref 0.3–6.2)
EPAP: 0
ERYTHROCYTE [DISTWIDTH] IN BLOOD BY AUTOMATED COUNT: 12 % (ref 12.3–15.4)
FLUAV SUBTYP SPEC NAA+PROBE: NOT DETECTED
FLUBV RNA ISLT QL NAA+PROBE: NOT DETECTED
GLOBULIN UR ELPH-MCNC: 2.1 GM/DL
GLUCOSE SERPL-MCNC: 94 MG/DL (ref 65–99)
HADV DNA SPEC NAA+PROBE: NOT DETECTED
HCO3 BLDV-SCNC: 30.1 MMOL/L (ref 22–28)
HCOV 229E RNA SPEC QL NAA+PROBE: NOT DETECTED
HCOV HKU1 RNA SPEC QL NAA+PROBE: NOT DETECTED
HCOV NL63 RNA SPEC QL NAA+PROBE: NOT DETECTED
HCOV OC43 RNA SPEC QL NAA+PROBE: NOT DETECTED
HCT VFR BLD AUTO: 47.7 % (ref 37.5–51)
HGB BLD-MCNC: 15.7 G/DL (ref 13–17.7)
HGB BLDA-MCNC: 16.3 G/DL (ref 13.5–17.5)
HMPV RNA NPH QL NAA+NON-PROBE: NOT DETECTED
HOLD SPECIMEN: NORMAL
HPIV1 RNA ISLT QL NAA+PROBE: NOT DETECTED
HPIV2 RNA SPEC QL NAA+PROBE: NOT DETECTED
HPIV3 RNA NPH QL NAA+PROBE: NOT DETECTED
HPIV4 P GENE NPH QL NAA+PROBE: NOT DETECTED
IMM GRANULOCYTES # BLD AUTO: 0.02 10*3/MM3 (ref 0–0.05)
IMM GRANULOCYTES NFR BLD AUTO: 0.2 % (ref 0–0.5)
INHALED O2 CONCENTRATION: 21 %
IPAP: 0
LYMPHOCYTES # BLD AUTO: 2.15 10*3/MM3 (ref 0.7–3.1)
LYMPHOCYTES NFR BLD AUTO: 24.9 % (ref 19.6–45.3)
M PNEUMO IGG SER IA-ACNC: NOT DETECTED
MCH RBC QN AUTO: 31.2 PG (ref 26.6–33)
MCHC RBC AUTO-ENTMCNC: 32.9 G/DL (ref 31.5–35.7)
MCV RBC AUTO: 94.8 FL (ref 79–97)
METHGB BLD QL: 0.4 %
MODALITY: ABNORMAL
MONOCYTES # BLD AUTO: 0.57 10*3/MM3 (ref 0.1–0.9)
MONOCYTES NFR BLD AUTO: 6.6 % (ref 5–12)
NEUTROPHILS NFR BLD AUTO: 5.73 10*3/MM3 (ref 1.7–7)
NEUTROPHILS NFR BLD AUTO: 66.4 % (ref 42.7–76)
NRBC BLD AUTO-RTO: 0 /100 WBC (ref 0–0.2)
NT-PROBNP SERPL-MCNC: 2444 PG/ML (ref 0–900)
OXYHGB MFR BLDV: 41.3 %
PAW @ PEAK INSP FLOW SETTING VENT: 0 CMH2O
PCO2 BLDV: 50.4 MM HG (ref 41–51)
PH BLDV: 7.38 PH UNITS (ref 7.31–7.41)
PLATELET # BLD AUTO: 208 10*3/MM3 (ref 140–450)
PMV BLD AUTO: 11.2 FL (ref 6–12)
PO2 BLDV: 25.4 MM HG (ref 27–53)
POTASSIUM SERPL-SCNC: 4.2 MMOL/L (ref 3.5–5.2)
PROT SERPL-MCNC: 5.9 G/DL (ref 6–8.5)
RBC # BLD AUTO: 5.03 10*6/MM3 (ref 4.14–5.8)
RHINOVIRUS RNA SPEC NAA+PROBE: NOT DETECTED
RSV RNA NPH QL NAA+NON-PROBE: NOT DETECTED
SARS-COV-2 RNA NPH QL NAA+NON-PROBE: NOT DETECTED
SODIUM SERPL-SCNC: 143 MMOL/L (ref 136–145)
TOTAL RATE: 0 BREATHS/MINUTE
TROPONIN T SERPL HS-MCNC: 15 NG/L
WBC NRBC COR # BLD: 8.63 10*3/MM3 (ref 3.4–10.8)
WHOLE BLOOD HOLD COAG: NORMAL
WHOLE BLOOD HOLD SPECIMEN: NORMAL

## 2023-11-12 PROCEDURE — 99285 EMERGENCY DEPT VISIT HI MDM: CPT

## 2023-11-12 PROCEDURE — 93005 ELECTROCARDIOGRAM TRACING: CPT | Performed by: STUDENT IN AN ORGANIZED HEALTH CARE EDUCATION/TRAINING PROGRAM

## 2023-11-12 PROCEDURE — 83880 ASSAY OF NATRIURETIC PEPTIDE: CPT | Performed by: STUDENT IN AN ORGANIZED HEALTH CARE EDUCATION/TRAINING PROGRAM

## 2023-11-12 PROCEDURE — 25510000001 IOPAMIDOL PER 1 ML: Performed by: STUDENT IN AN ORGANIZED HEALTH CARE EDUCATION/TRAINING PROGRAM

## 2023-11-12 PROCEDURE — 80053 COMPREHEN METABOLIC PANEL: CPT | Performed by: STUDENT IN AN ORGANIZED HEALTH CARE EDUCATION/TRAINING PROGRAM

## 2023-11-12 PROCEDURE — 0202U NFCT DS 22 TRGT SARS-COV-2: CPT | Performed by: STUDENT IN AN ORGANIZED HEALTH CARE EDUCATION/TRAINING PROGRAM

## 2023-11-12 PROCEDURE — 85025 COMPLETE CBC W/AUTO DIFF WBC: CPT | Performed by: STUDENT IN AN ORGANIZED HEALTH CARE EDUCATION/TRAINING PROGRAM

## 2023-11-12 PROCEDURE — 84484 ASSAY OF TROPONIN QUANT: CPT | Performed by: STUDENT IN AN ORGANIZED HEALTH CARE EDUCATION/TRAINING PROGRAM

## 2023-11-12 PROCEDURE — 25010000002 FUROSEMIDE PER 20 MG: Performed by: STUDENT IN AN ORGANIZED HEALTH CARE EDUCATION/TRAINING PROGRAM

## 2023-11-12 PROCEDURE — 71275 CT ANGIOGRAPHY CHEST: CPT

## 2023-11-12 PROCEDURE — 82805 BLOOD GASES W/O2 SATURATION: CPT

## 2023-11-12 PROCEDURE — 96374 THER/PROPH/DIAG INJ IV PUSH: CPT

## 2023-11-12 RX ORDER — FUROSEMIDE 40 MG/1
40 TABLET ORAL DAILY
Qty: 30 TABLET | Refills: 0 | Status: SHIPPED | OUTPATIENT
Start: 2023-11-12 | End: 2023-11-16 | Stop reason: SDUPTHER

## 2023-11-12 RX ORDER — SODIUM CHLORIDE 0.9 % (FLUSH) 0.9 %
10 SYRINGE (ML) INJECTION AS NEEDED
Status: DISCONTINUED | OUTPATIENT
Start: 2023-11-12 | End: 2023-11-12 | Stop reason: HOSPADM

## 2023-11-12 RX ORDER — FUROSEMIDE 10 MG/ML
80 INJECTION INTRAMUSCULAR; INTRAVENOUS ONCE
Status: COMPLETED | OUTPATIENT
Start: 2023-11-12 | End: 2023-11-12

## 2023-11-12 RX ADMIN — FUROSEMIDE 80 MG: 10 INJECTION, SOLUTION INTRAMUSCULAR; INTRAVENOUS at 16:30

## 2023-11-12 RX ADMIN — IOPAMIDOL 80 ML: 755 INJECTION, SOLUTION INTRAVENOUS at 17:06

## 2023-11-12 NOTE — ED PROVIDER NOTES
" EMERGENCY DEPARTMENT ENCOUNTER    Pt Name: Bj Pichardo  MRN: [id number]  Pt :   1970  Room Number:    Date of encounter:  2023  PCP: Olesya Kern DO  ED Provider: Garfield Mahajan MD    Historian: Patient, sister      HPI:  Chief Complaint: Exertional dyspnea, heart failure        Context: Bj Pichardo is a 52-year-old man with known heart failure with reduced ejection fraction who presents for evaluation of progressively worsening shortness of breath in the setting of being out of his Lasix for the last few days.  He is brought in by his sister who says she is frustrated he has not received timely care his cardiologist Dr. Yoder has not arranged for cardiac catheterization, or refilled his furosemide as she would expect.  He denies any acute changes today just says he has had gradually worsening exertional dyspnea over the last few days since stopping taking the Lasix.  He denies any chest pain at this time.  Denies fevers or systemic symptoms.  His shortness of breath is exertional but he denies orthopnea.  He does have unmanaged sleep apnea at this time.  He has history of alcohol use but says he has been sober for 2 months.  Says they decided to come to the emergency room today in order to \"expedite\" his care due to dissatisfaction with cardiology.  He denies any other complaints at this time.      PAST MEDICAL HISTORY  Past Medical History:   Diagnosis Date    Kidney stone          PAST SURGICAL HISTORY  No past surgical history on file.      FAMILY HISTORY  Family History   Problem Relation Age of Onset    Arthritis Mother     Arthritis Father     Heart attack Father     Ankylosing spondylitis Brother          SOCIAL HISTORY  Social History     Socioeconomic History    Marital status: Single   Tobacco Use    Smoking status: Never     Passive exposure: Never    Smokeless tobacco: Never   Vaping Use    Vaping Use: Never used   Substance and Sexual Activity    " Alcohol use: Not Currently    Drug use: Not Currently     Types: Marijuana     Comment: Rare    Sexual activity: Defer         ALLERGIES  Patient has no known allergies.        REVIEW OF SYSTEMS  Review of Systems       All systems reviewed and negative except for those discussed in HPI.       PHYSICAL EXAM    I have reviewed the triage vital signs and nursing notes.    ED Triage Vitals [11/12/23 1541]   Temp Heart Rate Resp BP SpO2   97.6 °F (36.4 °C) 115 16 117/79 95 %      Temp src Heart Rate Source Patient Position BP Location FiO2 (%)   Oral Monitor Sitting Left arm --       Physical Exam  GENERAL:   Appears chronically ill but in no acute distress  HENT: Nares patent.  EYES: No scleral icterus.  CV: Regular rhythm, regular rate.  RESPIRATORY: Normal effort.  No audible wheezes, rales or rhonchi.  ABDOMEN: Soft, nontender  MUSCULOSKELETAL: No deformities.   NEURO: Alert, moves all extremities, follows commands.  SKIN: Warm, dry, no rash visualized.      LAB RESULTS  Recent Results (from the past 24 hour(s))   ECG 12 Lead ED Triage Standing Order; SOA    Collection Time: 11/12/23  3:42 PM   Result Value Ref Range    QT Interval 366 ms    QTC Interval 462 ms   Comprehensive Metabolic Panel    Collection Time: 11/12/23  3:59 PM    Specimen: Blood   Result Value Ref Range    Glucose 94 65 - 99 mg/dL    BUN 13 6 - 20 mg/dL    Creatinine 1.24 0.76 - 1.27 mg/dL    Sodium 143 136 - 145 mmol/L    Potassium 4.2 3.5 - 5.2 mmol/L    Chloride 111 (H) 98 - 107 mmol/L    CO2 23.0 22.0 - 29.0 mmol/L    Calcium 8.8 8.6 - 10.5 mg/dL    Total Protein 5.9 (L) 6.0 - 8.5 g/dL    Albumin 3.8 3.5 - 5.2 g/dL    ALT (SGPT) 50 (H) 1 - 41 U/L    AST (SGOT) 30 1 - 40 U/L    Alkaline Phosphatase 91 39 - 117 U/L    Total Bilirubin 1.3 (H) 0.0 - 1.2 mg/dL    Globulin 2.1 gm/dL    A/G Ratio 1.8 g/dL    BUN/Creatinine Ratio 10.5 7.0 - 25.0    Anion Gap 9.0 5.0 - 15.0 mmol/L    eGFR 70.8 >60.0 mL/min/1.73   BNP    Collection Time: 11/12/23  3:59  PM    Specimen: Blood   Result Value Ref Range    proBNP 2,444.0 (H) 0.0 - 900.0 pg/mL   Single High Sensitivity Troponin T    Collection Time: 11/12/23  3:59 PM    Specimen: Blood   Result Value Ref Range    HS Troponin T 15 <22 ng/L   Green Top (Gel)    Collection Time: 11/12/23  3:59 PM   Result Value Ref Range    Extra Tube Hold for add-ons.    Lavender Top    Collection Time: 11/12/23  3:59 PM   Result Value Ref Range    Extra Tube hold for add-on    Gold Top - SST    Collection Time: 11/12/23  3:59 PM   Result Value Ref Range    Extra Tube Hold for add-ons.    Gray Top    Collection Time: 11/12/23  3:59 PM   Result Value Ref Range    Extra Tube Hold for add-ons.    Light Blue Top    Collection Time: 11/12/23  3:59 PM   Result Value Ref Range    Extra Tube Hold for add-ons.    CBC Auto Differential    Collection Time: 11/12/23  3:59 PM    Specimen: Blood   Result Value Ref Range    WBC 8.63 3.40 - 10.80 10*3/mm3    RBC 5.03 4.14 - 5.80 10*6/mm3    Hemoglobin 15.7 13.0 - 17.7 g/dL    Hematocrit 47.7 37.5 - 51.0 %    MCV 94.8 79.0 - 97.0 fL    MCH 31.2 26.6 - 33.0 pg    MCHC 32.9 31.5 - 35.7 g/dL    RDW 12.0 (L) 12.3 - 15.4 %    RDW-SD 41.4 37.0 - 54.0 fl    MPV 11.2 6.0 - 12.0 fL    Platelets 208 140 - 450 10*3/mm3    Neutrophil % 66.4 42.7 - 76.0 %    Lymphocyte % 24.9 19.6 - 45.3 %    Monocyte % 6.6 5.0 - 12.0 %    Eosinophil % 1.4 0.3 - 6.2 %    Basophil % 0.5 0.0 - 1.5 %    Immature Grans % 0.2 0.0 - 0.5 %    Neutrophils, Absolute 5.73 1.70 - 7.00 10*3/mm3    Lymphocytes, Absolute 2.15 0.70 - 3.10 10*3/mm3    Monocytes, Absolute 0.57 0.10 - 0.90 10*3/mm3    Eosinophils, Absolute 0.12 0.00 - 0.40 10*3/mm3    Basophils, Absolute 0.04 0.00 - 0.20 10*3/mm3    Immature Grans, Absolute 0.02 0.00 - 0.05 10*3/mm3    nRBC 0.0 0.0 - 0.2 /100 WBC   Respiratory Panel PCR w/COVID-19(SARS-CoV-2) DAYANARA/GONZALEZ/ZENIA/PAD/COR/MELANIE In-House, NP Swab in UTM/VTM, 2 HR TAT - Swab, Nasopharynx    Collection Time: 11/12/23  4:14 PM     Specimen: Nasopharynx; Swab   Result Value Ref Range    ADENOVIRUS, PCR Not Detected Not Detected    Coronavirus 229E Not Detected Not Detected    Coronavirus HKU1 Not Detected Not Detected    Coronavirus NL63 Not Detected Not Detected    Coronavirus OC43 Not Detected Not Detected    COVID19 Not Detected Not Detected - Ref. Range    Human Metapneumovirus Not Detected Not Detected    Human Rhinovirus/Enterovirus Not Detected Not Detected    Influenza A PCR Not Detected Not Detected    Influenza B PCR Not Detected Not Detected    Parainfluenza Virus 1 Not Detected Not Detected    Parainfluenza Virus 2 Not Detected Not Detected    Parainfluenza Virus 3 Not Detected Not Detected    Parainfluenza Virus 4 Not Detected Not Detected    RSV, PCR Not Detected Not Detected    Bordetella pertussis pcr Not Detected Not Detected    Bordetella parapertussis PCR Not Detected Not Detected    Chlamydophila pneumoniae PCR Not Detected Not Detected    Mycoplasma pneumo by PCR Not Detected Not Detected   Blood Gas, Venous With Co-Ox    Collection Time: 11/12/23  6:32 PM    Specimen: Venous Blood   Result Value Ref Range    Site Nurse/Dr Draw     pH, Venous 7.384 7.310 - 7.410 pH Units    pCO2, Venous 50.4 41.0 - 51.0 mm Hg    pO2, Venous 25.4 (L) 27.0 - 53.0 mm Hg    HCO3, Venous 30.1 (H) 22.0 - 28.0 mmol/L    Base Excess, Venous 3.7 (H) -2.0 - 2.0 mmol/L    Hemoglobin, Blood Gas 16.3 13.5 - 17.5 g/dL    Oxyhemoglobin Venous 41.3 %    Methemoglobin Venous 0.4 %    Carboxyhemoglobin Venous 1.0 %    CO2 Content 31.6 22 - 33 mmol/L    Temperature 37.0 C    Barometric Pressure for Blood Gas      Modality Room Air     FIO2 21 %    Rate 0 Breaths/minute    PIP 0 cmH2O    IPAP 0     EPAP 0        If labs were ordered, I independently reviewed the results and considered them in treating the patient.        RADIOLOGY  CT Angiogram Chest    Result Date: 11/12/2023  CT ANGIOGRAM CHEST Date of Exam: 11/12/2023 4:57 PM EST Indication: chest  pain/SOA, cardiomegaly and likely small effusion on POCUS. Comparison: Chest x-ray dated 10/16/2023 Technique: CTA of the chest was performed after the uneventful intravenous administration of 80 mL Isovue-370. Reconstructed coronal and sagittal images were also obtained. In addition, a 3-D volume rendered image was created for interpretation. Automated exposure control and iterative reconstruction methods were used. FINDINGS: Thoracic inlet: Unremarkable. Pulmonary arteries: No filling defects are identified within the pulmonary arteries to suggest acute pulmonary embolism. Great vessels: The thoracic aorta and proximal arch vessels appear unremarkable. Mediastinum/Charleen: No pathologically enlarged mediastinal lymph nodes are seen. The esophagus appears unremarkable. Lung parenchyma: Scattered bibasilar atelectasis. No acute infiltrate is seen. No suspicious pulmonary nodules are seen. Trachea and airways: The trachea and central airways appear unremarkable. Pleural space: Small right pleural effusion. No pneumothorax. No significant effusion on the left. Heart and pericardium: Moderate global cardiomegaly. Small to moderate pericardial effusion. Chest wall: No acute or suspicious osseous or soft tissue lesion is identified. Upper abdomen: No acute abnormality is identified within the visualized upper abdomen. Partially visualized left pyelectasis or extrarenal pelvis. Contrast reflux within the IVC/hepatic veins.     1.No evidence of acute pulmonary embolism. 2.Moderate global cardiomegaly with small to moderate pericardial effusion, small right pleural effusion, and scattered bibasilar atelectasis. 3.Contrast reflux within the IVC/hepatic vein, suggestive of right heart dysfunction. Electronically Signed: Iraj River MD  11/12/2023 5:12 PM EST  Workstation ID: [id number]     I ordered and independently reviewed the above noted radiographic studies.      I viewed images of CTA of the chest which does not show  pulmonary embolism or aortic injury he does have cardiomegaly with small pleural effusions seems consistent with prior echocardiogram.    See radiologist's dictation for official interpretation.        PROCEDURES          Emergent point-of-care ultrasound was performed at the bedside using the cardiac probe.  Windows obtained included parasternal long axis parasternal short axis and apical four-chamber.  He has cardiomegaly and I estimate to be around 30% ejection fraction small pericardial effusion without evidence of tamponade.        Procedures    ECG 12 Lead ED Triage Standing Order; SOA   Preliminary Result   Test Reason : ED Triage Standing Order~   Blood Pressure :   */*   mmHG   Vent. Rate :  96 BPM     Atrial Rate :  96 BPM      P-R Int : 148 ms          QRS Dur : 128 ms       QT Int : 366 ms       P-R-T Axes :  51 -53 113 degrees      QTc Int : 462 ms      Sinus rhythm with occasional premature ventricular complexes   Left axis deviation   Left ventricular hypertrophy with QRS widening and repolarization    abnormality ( R in aVL , Drifton product )   Inferior infarct , age undetermined   Abnormal ECG   When compared with ECG of 08-APR-2016 23:55,   Significant changes have occurred      Referred By:            Confirmed By:           MEDICATIONS GIVEN IN ER    Medications   furosemide (LASIX) injection 80 mg (80 mg Intravenous Given 11/12/23 1630)   iopamidol (ISOVUE-370) 76 % injection 100 mL (80 mL Intravenous Given 11/12/23 1706)         MEDICAL DECISION MAKING, PROGRESS, and CONSULTS    All labs, if obtained, have been independently reviewed by me.  All radiology studies, if obtained, have been reviewed by me and the radiologist dictating the report.  All EKG's, if obtained, have been independently viewed and interpreted by me/my attending physician.      Discussion below represents my analysis of pertinent findings related to patient's condition, differential diagnosis, treatment plan and final  disposition.                         Differential diagnosis:    Heart failure, pneumonia, pulmonary embolism, cardiac tamponade, myocardial infarction, angina, COVID, flu, viral URI, anemia, electrolyte abnormality      Additional sources:    - Discussed/ obtained information from independent historians: Sister at bedside    - External (non-ED) record review:  Chart review of recent cardiology note with Dr. Yoder shows history of dilated cardiomyopathy and heart failure with 20% ejection fraction, history of alcohol abuse.    - Chronic or social conditions impacting care: Heart failure, dilated cardiomyopathy    - Shared decision making: Agreeable to discharge with cardiology follow-up and return precautions, restarting his Lasix.      Orders placed during this visit:  Orders Placed This Encounter   Procedures    COVID PRE-OP / PRE-PROCEDURE SCREENING ORDER (NO ISOLATION) - Swab, Nasopharynx    Respiratory Panel PCR w/COVID-19(SARS-CoV-2) DAYANARA/GONZALEZ/ZENIA/PAD/COR/MELANIE In-House, NP Swab in UTM/VTM, 2 HR TAT - Swab, Nasopharynx    CT Angiogram Chest    Purmela Draw    Comprehensive Metabolic Panel    BNP    Single High Sensitivity Troponin T    CBC Auto Differential    Blood Gas, Venous With Co-Ox    Ambulatory Referral to [facility]    Undress & Gown    Continuous Pulse Oximetry    Vital Signs    ECG 12 Lead ED Triage Standing Order; SOA    CBC & Differential    Green Top (Gel)    Lavender Top    Gold Top - SST    Gray Top    Light Blue Top         Additional orders considered but not ordered:      ED Course:    Consultants:      ED Course as of 11/12/23 2316   Sun Nov 12, 2023   2599 Chart review of recent cardiology note with Dr. Yoder shows history of dilated cardiomyopathy and heart failure with 20% ejection fraction, history of alcohol abuse. [CC]   1612 In summary this is a 52-year-old man with known heart failure with reduced ejection fraction who presents for evaluation of progressively  "worsening shortness of breath in the setting of being out of his Lasix for the last few days.  He is brought in by his sister who says she is frustrated he has not received timely care his cardiologist Dr. Yoder has not arranged for cardiac catheterization, or refilled his furosemide as she would expect.  He denies any acute changes today just says he has had gradually worsening exertional dyspnea over the last few days since stopping taking the Lasix.  He denies any chest pain at this time.  Denies fevers or systemic symptoms.  His shortness of breath is exertional but he denies orthopnea.  He does have unmanaged sleep apnea at this time.  He has history of alcohol use but says he has been sober for 2 months.  Says they decided to come to the emergency room today in order to \"expedite\" his care due to dissatisfaction with cardiology.  He denies any other complaints at this time. [CC]   1614 He arrived awake and alert initially tachycardic in triage at 115 by the time he was roomed this had resolved current heart rate is in the low 90s.  Satting in the high 90s on room air.  No significant pitting edema on exam.  Point-of-care echo was performed and he does have cardiomegaly with what I would estimate to be a 30% ejection fraction symmetric squeeze.  Possible mild pericardial effusion without evidence of tamponade.  He has not taken his Lasix for several days so treating with 80 of IV Lasix.  Obtaining cardiac work-up.  Will reevaluate pending initial work-up. [CC]   1915 CTA of the chest does not show pulmonary embolism or aortic injury he does have small to moderate effusion which is consistent with his prior echocardiogram and findings consistent with his known heart failure.  CBC and CMP generally reassuring and nonactionable very mild hyperchloremia.  proBNP is elevated at 2400 however this appears to be downtrending from prior values.  No elevation in high-sensitivity troponin.  Full viral panel is " negative.  Blood gas shows extremely mild hypoxemia and hypercapnia without acidosis.  Continues to sat well on room air. [CC]   1933 Upon reevaluation he seems to be diuresing well after the Lasix says he is feeling much better and less short of breath.  We discussed his results ultimately I think everything is either the same or improving from prior values and think he can safely discharge.  I am restarting his Lasix.  He already has follow-up both with his PCP and cardiologist in the next few days.  Counseled on return precautions verbally expressed understanding of these. [CC]      ED Course User Index  [CC] Garfield Mahajan MD              Shared Decision Making:  After my consideration of clinical presentation and any laboratory/radiology studies obtained, I discussed the findings with the patient/patient representative who is in agreement with the treatment plan and the final disposition.   Risks and benefits of discharge and/or observation/admission were discussed.       AS OF 23:16 EST VITALS:    BP - 102/67  HR - 73  TEMP - 98.2 °F (36.8 °C) (Oral)  O2 SATS - 97%                  DIAGNOSIS  Final diagnoses:   Acute on chronic congestive heart failure, unspecified heart failure type   Dyspnea, unspecified type   HFrEF (heart failure with reduced ejection fraction)   Elevated brain natriuretic peptide (BNP) level         DISPOSITION  DISCHARGE    Patient discharged in stable condition.    Reviewed implications of results, diagnosis, meds, responsibility to follow up, warning signs and symptoms of possible worsening, potential complications and reasons to return to ER.    Patient/Family voiced understanding of above instructions.    Discussed plan for discharge, as there is no emergent indication for admission.  Pt/family is agreeable and understands need for follow up and possible repeat testing.  Pt/family is aware that discharge does not mean that nothing is wrong but that it indicates no emergency  is currently present that requires admission and they must continue care with follow-up as given below or with a physician of their choice.     FOLLOW-UP  [facility]  [address]  [address]  [facility] [address] [phone]             Medication List        New Prescriptions      furosemide 40 MG tablet  Commonly known as: LASIX  Take 1 tablet by mouth Daily for 30 days.               Where to Get Your Medications        These medications were sent to [facility] [id number] - [place], [place] - [facility] [address] AT [facility] [address] - [phone]  - [phone] [place] [address] [address], [facility] [address]      Phone: [phone]   furosemide 40 MG tablet             Please note that portions of this document were completed with voice recognition software.        Garfield Mahajan MD  11/12/23 4163

## 2023-11-13 ENCOUNTER — TELEPHONE (OUTPATIENT)
Dept: CARDIOLOGY | Facility: CLINIC | Age: 53
End: 2023-11-13
Payer: COMMERCIAL

## 2023-11-13 DIAGNOSIS — I51.9 SYSTOLIC DYSFUNCTION: Primary | ICD-10-CM

## 2023-11-13 LAB
QT INTERVAL: 366 MS
QTC INTERVAL: 462 MS

## 2023-11-13 NOTE — TELEPHONE ENCOUNTER
Received voicemail from patient's sister regarding recent ED visit and frustration with care.    Spoke with patient and confirmed that we never received a refill request for his lasix and reminded patient to mail back Farx[employer] assistance forms to the office ASAP to get approval. Farxiga copay card information sent to pharmacy. Patient states he understands the importance of requesting refills in a timely manner and reaching out to the office with any questions. Patient informed that sleep study referrals can take some time as they have a lot of patients. All questions answered and patient is agreeable to plan.    Attempted to call patient's sister, voicemail box is full and cannot accept new calls.

## 2023-11-13 NOTE — TELEPHONE ENCOUNTER
Received a call from an MA at the main office that patient's sister stopped her in the hallway needing to talk to someone about her brother's care. This RN attempted another phone call, no VM was able to be left as the mailbox is full.

## 2023-11-13 NOTE — TELEPHONE ENCOUNTER
Spoke with patient's sister regarding recommendations. Pt's sister aware of appointment Friday 11/17 at 9:45, she was given the results of the stress test and heart cath recommendations per Dr. Yoder. She understands his hesitations about Life Vests and will talk more about it at the appointment Friday if necessary. Pt's sister is agreeable to plan and all questions answered at this time.

## 2023-11-13 NOTE — DISCHARGE INSTRUCTIONS
Take the provided Lasix medicine follow-up with cardiology and with your PCP.  While today's work-up was reassuring if your symptoms change or worsen please return to the ED or seek other medical care.

## 2023-11-13 NOTE — TELEPHONE ENCOUNTER
"Spoke with patient's sister regarding concerns of patient's decline over the weekend. She is worried about his EF being 20% and a slow response from sleep medicine, she is worried that he will \"suddenly die in his sleep.\" She has asked about a Life Vest and if this would be appropriate for the patient as well as a heart cath.    She mentioned that she has not received results from the patient's stress test on 10/19 and that Dr. Yoder mentioned at the patient's last appointment that he would be reaching out with them and any recommendations.     Please advise.  "

## 2023-11-16 ENCOUNTER — OFFICE VISIT (OUTPATIENT)
Dept: FAMILY MEDICINE CLINIC | Facility: CLINIC | Age: 53
End: 2023-11-16
Payer: COMMERCIAL

## 2023-11-16 VITALS
HEART RATE: 72 BPM | SYSTOLIC BLOOD PRESSURE: 100 MMHG | WEIGHT: 199.4 LBS | BODY MASS INDEX: 27.92 KG/M2 | HEIGHT: 71 IN | DIASTOLIC BLOOD PRESSURE: 78 MMHG | OXYGEN SATURATION: 98 %

## 2023-11-16 DIAGNOSIS — I42.0 DILATED CARDIOMYOPATHY: ICD-10-CM

## 2023-11-16 DIAGNOSIS — Z12.11 SCREEN FOR COLON CANCER: ICD-10-CM

## 2023-11-16 DIAGNOSIS — I50.20 HFREF (HEART FAILURE WITH REDUCED EJECTION FRACTION): Primary | ICD-10-CM

## 2023-11-16 DIAGNOSIS — F41.1 GENERALIZED ANXIETY DISORDER: ICD-10-CM

## 2023-11-16 RX ORDER — FUROSEMIDE 40 MG/1
40 TABLET ORAL DAILY
Qty: 30 TABLET | Refills: 5 | Status: SHIPPED | OUTPATIENT
Start: 2023-11-16 | End: 2024-05-14

## 2023-11-16 NOTE — PROGRESS NOTES
Chief Complaint   Patient presents with    Shortness of Breath     4 week follow up       HPI:  Bj Pichardo is a 52 y.o. male with cardiomyopathy and severe systolic dysfunction who presents today for FU on shortness of breath.    Since establish care visit last month patient states symptoms have improved.  He was started on Lasix after initial blood work showed elevated BNP and creatinine.  After just a few days of the Lasix symptoms began to improve.  A stat echocardiogram in the interim which showed severe systolic dysfunction and EF <20%.  He was originally referred to cardiology and had stress testing done when showed no significant ischemia or CAD.  Has follow-up scheduled with his cardiologist Dr. Yoder tomorrow.  Ran out of his Lasix last week and became progressively more short of breath.  This prompted an ER visit on 11/12 where he was treated with IV Lasix 80 mg and had immediate relief in his dyspnea.  He has been taking 40 mg daily since that time and continues to report improvement in his symptoms.  He has stopped drinking as of 2 months ago.  Working on trying to get himself healthier. Was started on Farxiga and BB. Having cost issues with the Farxiga, was going to be > $600. Had samples but he is about to run out.    Anxiety has been better with starting the Celexa.  Noticed some initial sleep disturbance and fatigue at first but these have subsided.  Tolerating medication well otherwise.      PE:  Vitals:    11/16/23 1029   BP: 100/78   Pulse: 72   SpO2: 98%      Body mass index is 27.81 kg/m².    Gen Appearance: NAD  HEENT: Normocephalic, EOMI  Lungs: Normal WOB, O2 sat appropriate  MSK: Moves all extremities well, normal gait, no peripheral edema  Neuro: No focal deficits    Current Outpatient Medications   Medication Sig Dispense Refill    aspirin 81 MG chewable tablet Chew 1 tablet Daily.      carvedilol (COREG) 6.25 MG tablet Take 1 tablet by mouth 2 (Two) Times a Day. 180 tablet 3     citalopram (CeleXA) 20 MG tablet Take 1 tablet by mouth Daily. 30 tablet 2    dapagliflozin Propanediol (Farxiga) 10 MG tablet Take 10 mg by mouth Daily. 90 tablet 3    furosemide (LASIX) 40 MG tablet Take 1 tablet by mouth Daily for 180 days. 30 tablet 5    losartan (COZAAR) 25 MG tablet Take 1 tablet by mouth Daily. 30 tablet 1     No current facility-administered medications for this visit.        A/P:  Diagnoses and all orders for this visit:    1. HFrEF (heart failure with reduced ejection fraction) (Primary)  Assessment & Plan:  Now following w Cardiology ([facility])  Echo, stress testing reviewed  Recent BMP showed improved renal function, normal K level  Refill Lasix, will continue at 40mg daily  Cont ASA, coreg, Losartan, Farxiga- samples were provided for Farxiga  Discussed heart healthy diet, physical activity as tolerated    Orders:  -     furosemide (LASIX) 40 MG tablet; Take 1 tablet by mouth Daily for 180 days.  Dispense: 30 tablet; Refill: 5    2. Dilated cardiomyopathy  Assessment & Plan:  Management as noted above    Orders:  -     furosemide (LASIX) 40 MG tablet; Take 1 tablet by mouth Daily for 180 days.  Dispense: 30 tablet; Refill: 5    3. Generalized anxiety disorder  Assessment & Plan:  Reports improvement in symptoms  Cont Celexa       4. Screen for colon cancer  -     Cologuard - Stool, Per Rectum; Future       FU in 3 months for physical, fasting labs  Politely declined flu vaccination    Return in about 3 months (around 2/16/2024) for Annual and FU on anxiety/HTN/ICM.     Dictated Utilizing Dragon Dictation    Please note that portions of this note were completed with a voice recognition program.    Part of this note may be an electronic transcription/translation of spoken language to printed text using the Dragon Dictation System.

## 2023-11-17 ENCOUNTER — OFFICE VISIT (OUTPATIENT)
Dept: CARDIOLOGY | Facility: CLINIC | Age: 53
End: 2023-11-17
Payer: COMMERCIAL

## 2023-11-17 VITALS
SYSTOLIC BLOOD PRESSURE: 108 MMHG | HEART RATE: 78 BPM | OXYGEN SATURATION: 96 % | WEIGHT: 199 LBS | DIASTOLIC BLOOD PRESSURE: 70 MMHG | HEIGHT: 71 IN | BODY MASS INDEX: 27.86 KG/M2

## 2023-11-17 DIAGNOSIS — I51.9 SYSTOLIC DYSFUNCTION: Primary | ICD-10-CM

## 2023-11-17 RX ORDER — EPLERENONE 25 MG/1
12.5 TABLET, FILM COATED ORAL DAILY
Qty: 45 TABLET | Refills: 3 | Status: SHIPPED | OUTPATIENT
Start: 2023-11-17

## 2023-11-17 NOTE — PROGRESS NOTES
"Chief Complaint  Follow-up (2 week )      Subjective   History of Present Illness    Problem List  -compensated severe systolic dysfunction, etiology unknown but likely idiopathic dilated cardiomyopathy  -prior heavy drinking  -family hx of CAD  -PVC, 9%   -EKG, frequent PVCs, echo LVEF 20%, nuclear scan appears NICM (no CAC as well)    Mr. Pichardo is a 52 year old man with above hx.  Was a little short of breath.  CXR showed cardiomegaly and echo showed severe systolic dysfunction. Placed on diuretic and symptoms resolved.  Was drinking heavily but quit a few weeks ago.  ROS negative except for the above.        Update 11/17/23  Ran out of lasix and went to ER.  Got some more lasix and doing well.  Feels great.  9% pvc burden       Objective   Vital Signs:  Vitals:    11/17/23 0930   BP: 108/70   Pulse: 78   SpO2: 96%     Estimated body mass index is 27.75 kg/m² as calculated from the following:    Height as of this encounter: 180.3 cm (71\").    Weight as of this encounter: 90.3 kg (199 lb).       Physical Exam  HENT:      Head: Normocephalic.   Eyes:      Extraocular Movements: Extraocular movements intact.   Cardiovascular:      Rate and Rhythm: Normal rate and regular rhythm.      Heart sounds: No murmur heard.     No gallop.   Pulmonary:      Breath sounds: Normal breath sounds.   Abdominal:      Palpations: Abdomen is soft.   Musculoskeletal:      Right lower leg: No edema.      Left lower leg: No edema.   Skin:     General: Skin is warm and dry.   Neurological:      General: No focal deficit present.      Mental Status: He is alert.   Psychiatric:         Mood and Affect: Mood normal.               Assessment   -compensated severe systolic dysfunction, etiology unknown but likely idiopathic dilated cardiomyopathy  -prior heavy drinking  -family hx of CAD  -PVC, 9%   -EKG, frequent PVCs, echo LVEF 20%, nuclear scan appears NICM (no CAC as well)    Plan   -on losartan, coreg, farxiga and aspirin.  Adding " inspira, blood work next week.  2 week follow up  -start coreg and farxiga  -eventually need LHC/RHC or coronary CT angiogram and possible CAROLE.  Should repeat echo in a month or two (apparent POC ultrasound in ER showed at least some improvement)  -1-2 week follow up      Vega Yoder MD  11/02/2023 11:02 EDT

## 2023-11-20 ENCOUNTER — OFFICE VISIT (OUTPATIENT)
Dept: SLEEP MEDICINE | Facility: HOSPITAL | Age: 53
End: 2023-11-20
Payer: COMMERCIAL

## 2023-11-20 VITALS
HEART RATE: 69 BPM | OXYGEN SATURATION: 97 % | TEMPERATURE: 98 F | DIASTOLIC BLOOD PRESSURE: 61 MMHG | SYSTOLIC BLOOD PRESSURE: 102 MMHG | HEIGHT: 71 IN | BODY MASS INDEX: 27.38 KG/M2 | WEIGHT: 195.6 LBS

## 2023-11-20 DIAGNOSIS — G47.33 OBSTRUCTIVE SLEEP APNEA, ADULT: ICD-10-CM

## 2023-11-20 DIAGNOSIS — R06.83 SNORING: Primary | ICD-10-CM

## 2023-11-20 DIAGNOSIS — G47.19 EXCESSIVE DAYTIME SLEEPINESS: ICD-10-CM

## 2023-11-20 NOTE — PROGRESS NOTES
Bj Pichardo is a 52 y.o. male.   Chief Complaint   Patient presents with    Sleep Apnea     Possible diagnosis given to him due to enlarged heart       HPI     52 y.o. male seen in consultation at the request of Dr Yoder for evaluation of the above.     His route to the sleep clinic is rather circuitous but started this summer when he became more dyspneic.  He had a work-up and was discovered to have an enlarged heart.  Further work-up revealed a dilated cardiomyopathy without evidence of ischemia consistent with NICM.  EF was less than 20% with severe MR and TR.    He typically sleeps alone but when he was traveling with family his brother and sister-in-law noted that he had snoring and apneas.  He would jerk at night as if he was stopping breathing.  His family was fairly adamant that he should be checked for sleep apnea.    From his standpoint he admits to awakening 2 or 3 times per night.  This has become more frequent now that he is on a diuretic.  He is somnolent during the day and will nap for 30 to 60 minutes when time allows.  He thinks he averages around 7 hours of sleep per night in total.  He will often not feel rested on waking up and awakens with a dry mouth.    He has no nocturnal hallucinations or sleep paralysis.  He does not have significant RLS type symptoms.    Whittier Scale is: 10/24    The patient's relevant past medical, surgical, family, and social history reviewed and updated in Epic as appropriate.    Current medications are:   Current Outpatient Medications:     aspirin 81 MG chewable tablet, Chew 1 tablet Daily., Disp: , Rfl:     carvedilol (COREG) 6.25 MG tablet, Take 1 tablet by mouth 2 (Two) Times a Day., Disp: 180 tablet, Rfl: 3    citalopram (CeleXA) 20 MG tablet, Take 1 tablet by mouth Daily., Disp: 30 tablet, Rfl: 2    dapagliflozin Propanediol (Farxiga) 10 MG tablet, Take 10 mg by mouth Daily., Disp: 90 tablet, Rfl: 3    eplerenone (INSPRA) 25 MG tablet, Take 0.5  "tablets by mouth Daily., Disp: 45 tablet, Rfl: 3    furosemide (LASIX) 40 MG tablet, Take 1 tablet by mouth Daily for 180 days., Disp: 30 tablet, Rfl: 5    losartan (COZAAR) 25 MG tablet, Take 1 tablet by mouth Daily., Disp: 30 tablet, Rfl: 1.    Review of Systems    Review of Systems  ROS documented in patient questionnaire ×14 systems.  Reviewed with patient.  Otherwise negative except as noted in HPI.    Physical Exam    Blood pressure 102/61, pulse 69, temperature 98 °F (36.7 °C), temperature source Infrared, height 180.3 cm (71\"), weight 88.7 kg (195 lb 9.6 oz), SpO2 97%. Body mass index is 27.28 kg/m².    Physical Exam  Vitals and nursing note reviewed.   Constitutional:       Appearance: Normal appearance. He is well-developed.   HENT:      Head: Normocephalic and atraumatic.      Nose: Nose normal.      Mouth/Throat:      Mouth: Mucous membranes are moist.      Pharynx: Oropharynx is clear. No oropharyngeal exudate.      Comments: Class IV anatomy  Eyes:      General: No scleral icterus.     Conjunctiva/sclera: Conjunctivae normal.   Neck:      Thyroid: No thyromegaly.      Trachea: No tracheal deviation.   Cardiovascular:      Rate and Rhythm: Normal rate and regular rhythm.      Heart sounds: No murmur heard.     No friction rub. No gallop.   Pulmonary:      Effort: Pulmonary effort is normal. No respiratory distress.      Breath sounds: No wheezing or rales.   Musculoskeletal:         General: No deformity. Normal range of motion.   Skin:     General: Skin is warm and dry.      Findings: No rash.   Neurological:      Mental Status: He is alert and oriented to person, place, and time.   Psychiatric:         Behavior: Behavior normal.         Thought Content: Thought content normal.       DATA:    Reviewed 11/17/2023 note from Dr. Yoder    Reviewed echocardiogram dated 10/27/2023 revealing less than 20% EF with severe MR and TR    ASSESSMENT:    Problem List Items Addressed This Visit    None  Visit " Diagnoses       Snoring    -  Primary    Relevant Orders    Home Sleep Study    Obstructive sleep apnea, adult        Relevant Orders    Home Sleep Study    Excessive daytime sleepiness        Relevant Orders    Home Sleep Study            52-year-old male with a high clinical suspicion for the presence of sleep apnea, probably obstructive.  He has snoring and frequent nocturnal awakenings.  He has nonrestorative sleep/daytime somnolence and awakens with a dry mouth.  He has a class IV airway putting him at risk for airway obstruction at night.  He has a low EF and certainly could also have a more complex form of sleep apnea or central sleep apnea.  His heart failure is compensated at this point and he has not any oxygen I think that the most expeditious work-up would be starting with a home sleep apnea test and initiating treatment for sleep apnea if that is present.  Details as below.    PLAN:    Home sleep apnea testing is an appropriate initial diagnostic step in his case.  I discussed the diagnostic process as well as treatment options for obstructive sleep apnea if that is diagnosed.  I went over the long-term cardiovascular and metabolic risks of untreated obstructive sleep apnea.  The patient was amenable to a trial of CPAP therapy if deemed appropriate after testing complete.  Close sleep center follow-up.      I have reviewed the results of my evaluation and impression and discussed my recommendations in detail with the patient.    Signed by  Girish oSria MD    November 20, 2023      CC: Olesya Kern DO          No ref. provider found

## 2023-11-27 ENCOUNTER — LAB (OUTPATIENT)
Dept: LAB | Facility: HOSPITAL | Age: 53
End: 2023-11-27
Payer: COMMERCIAL

## 2023-11-27 DIAGNOSIS — I51.9 SYSTOLIC DYSFUNCTION: ICD-10-CM

## 2023-11-27 DIAGNOSIS — I49.3 PVC (PREMATURE VENTRICULAR CONTRACTION): ICD-10-CM

## 2023-11-27 LAB
ALBUMIN SERPL-MCNC: 4.1 G/DL (ref 3.5–5.2)
ALBUMIN/GLOB SERPL: 1.5 G/DL
ALP SERPL-CCNC: 98 U/L (ref 39–117)
ALT SERPL W P-5'-P-CCNC: 25 U/L (ref 1–41)
ANION GAP SERPL CALCULATED.3IONS-SCNC: 11 MMOL/L (ref 5–15)
AST SERPL-CCNC: 19 U/L (ref 1–40)
BASOPHILS # BLD AUTO: 0.05 10*3/MM3 (ref 0–0.2)
BASOPHILS NFR BLD AUTO: 0.7 % (ref 0–1.5)
BILIRUB SERPL-MCNC: 1.3 MG/DL (ref 0–1.2)
BUN SERPL-MCNC: 23 MG/DL (ref 6–20)
BUN/CREAT SERPL: 18.1 (ref 7–25)
CALCIUM SPEC-SCNC: 9.6 MG/DL (ref 8.6–10.5)
CHLORIDE SERPL-SCNC: 103 MMOL/L (ref 98–107)
CHOLEST SERPL-MCNC: 204 MG/DL (ref 0–200)
CO2 SERPL-SCNC: 28 MMOL/L (ref 22–29)
CREAT SERPL-MCNC: 1.27 MG/DL (ref 0.76–1.27)
CRP SERPL-MCNC: 0.89 MG/DL (ref 0.01–0.5)
DEPRECATED RDW RBC AUTO: 40.8 FL (ref 37–54)
EGFRCR SERPLBLD CKD-EPI 2021: 68.8 ML/MIN/1.73
EOSINOPHIL # BLD AUTO: 0.08 10*3/MM3 (ref 0–0.4)
EOSINOPHIL NFR BLD AUTO: 1.1 % (ref 0.3–6.2)
ERYTHROCYTE [DISTWIDTH] IN BLOOD BY AUTOMATED COUNT: 11.9 % (ref 12.3–15.4)
GLOBULIN UR ELPH-MCNC: 2.8 GM/DL
GLUCOSE SERPL-MCNC: 97 MG/DL (ref 65–99)
HCT VFR BLD AUTO: 48.4 % (ref 37.5–51)
HDLC SERPL-MCNC: 34 MG/DL (ref 40–60)
HGB BLD-MCNC: 16.5 G/DL (ref 13–17.7)
IMM GRANULOCYTES # BLD AUTO: 0.01 10*3/MM3 (ref 0–0.05)
IMM GRANULOCYTES NFR BLD AUTO: 0.1 % (ref 0–0.5)
LDLC SERPL CALC-MCNC: 145 MG/DL (ref 0–100)
LDLC/HDLC SERPL: 4.21 {RATIO}
LYMPHOCYTES # BLD AUTO: 1.46 10*3/MM3 (ref 0.7–3.1)
LYMPHOCYTES NFR BLD AUTO: 19.9 % (ref 19.6–45.3)
MCH RBC QN AUTO: 31.5 PG (ref 26.6–33)
MCHC RBC AUTO-ENTMCNC: 34.1 G/DL (ref 31.5–35.7)
MCV RBC AUTO: 92.5 FL (ref 79–97)
MONOCYTES # BLD AUTO: 0.45 10*3/MM3 (ref 0.1–0.9)
MONOCYTES NFR BLD AUTO: 6.1 % (ref 5–12)
NEUTROPHILS NFR BLD AUTO: 5.27 10*3/MM3 (ref 1.7–7)
NEUTROPHILS NFR BLD AUTO: 72.1 % (ref 42.7–76)
NRBC BLD AUTO-RTO: 0 /100 WBC (ref 0–0.2)
NT-PROBNP SERPL-MCNC: 1311 PG/ML (ref 0–900)
PLATELET # BLD AUTO: 282 10*3/MM3 (ref 140–450)
PMV BLD AUTO: 11.3 FL (ref 6–12)
POTASSIUM SERPL-SCNC: 4.4 MMOL/L (ref 3.5–5.2)
PROT SERPL-MCNC: 6.9 G/DL (ref 6–8.5)
RBC # BLD AUTO: 5.23 10*6/MM3 (ref 4.14–5.8)
SODIUM SERPL-SCNC: 142 MMOL/L (ref 136–145)
TRIGL SERPL-MCNC: 135 MG/DL (ref 0–150)
VLDLC SERPL-MCNC: 25 MG/DL (ref 5–40)
WBC NRBC COR # BLD AUTO: 7.32 10*3/MM3 (ref 3.4–10.8)

## 2023-11-27 PROCEDURE — 80061 LIPID PANEL: CPT

## 2023-11-27 PROCEDURE — 80053 COMPREHEN METABOLIC PANEL: CPT

## 2023-11-27 PROCEDURE — 83695 ASSAY OF LIPOPROTEIN(A): CPT

## 2023-11-27 PROCEDURE — 83880 ASSAY OF NATRIURETIC PEPTIDE: CPT

## 2023-11-27 PROCEDURE — 86141 C-REACTIVE PROTEIN HS: CPT

## 2023-11-27 PROCEDURE — 36415 COLL VENOUS BLD VENIPUNCTURE: CPT

## 2023-11-27 PROCEDURE — 85025 COMPLETE CBC W/AUTO DIFF WBC: CPT

## 2023-11-28 ENCOUNTER — TELEPHONE (OUTPATIENT)
Dept: FAMILY MEDICINE CLINIC | Facility: CLINIC | Age: 53
End: 2023-11-28
Payer: COMMERCIAL

## 2023-11-28 LAB — LPA SERPL-SCNC: 85.3 NMOL/L

## 2023-11-28 NOTE — TELEPHONE ENCOUNTER
Patient informed of recommendations from Dr. Kern. He stated he would make an appointment if he is not feeling better but appreciated the call back and will just use OTC meds for short term to see if they help his sx.

## 2023-11-28 NOTE — TELEPHONE ENCOUNTER
Anti histamines like Claritin, Allegra or Zyrtec in combination with nasal sprays (Astepro, Flonase, nasal saline spray) are generally the safest option. Mucinex or Mucinex DM is OK to use for short duration if symptoms persist. Would not recommend using for more than 5 days.

## 2023-11-28 NOTE — TELEPHONE ENCOUNTER
Caller: Bj Pichardo    Relationship: Self    Best call back number: [phone]     Which medication are you concerned about: MUCINEX    Who prescribed you this medication: OTC    When did you start taking this medication:     What are your concerns: IT SAYS DON'T TAKE IF YOU HAVE HEART DISEASE OR HIGH BLOOD PRESSURE    HE HAS A RUNNY NOSE AND COUGH AND WOULD LIKE TO TAKE SOMETHING BUT HE WAS RECENTLY DIAGNOSED WITH AN ENLARGED HEART.     PLEASE CALL AND ADVISE.

## 2023-11-29 NOTE — PROGRESS NOTES
Lipoprotein (a) elevated.  Increases risk of atherosclerosis. Can run in families.  Other family members may want to be tested, will discuss at next visit

## 2023-12-01 ENCOUNTER — OFFICE VISIT (OUTPATIENT)
Dept: CARDIOLOGY | Facility: CLINIC | Age: 53
End: 2023-12-01
Payer: COMMERCIAL

## 2023-12-01 VITALS
BODY MASS INDEX: 27.16 KG/M2 | HEIGHT: 71 IN | HEART RATE: 64 BPM | SYSTOLIC BLOOD PRESSURE: 130 MMHG | WEIGHT: 194 LBS | DIASTOLIC BLOOD PRESSURE: 82 MMHG | OXYGEN SATURATION: 97 %

## 2023-12-01 DIAGNOSIS — I51.9 SYSTOLIC DYSFUNCTION: Primary | ICD-10-CM

## 2023-12-01 RX ORDER — LOSARTAN POTASSIUM 100 MG/1
100 TABLET ORAL DAILY
Qty: 90 TABLET | Refills: 3 | Status: SHIPPED | OUTPATIENT
Start: 2023-12-01

## 2023-12-01 NOTE — PROGRESS NOTES
"Chief Complaint  Follow-up (2 week)      Subjective   History of Present Illness    Problem List  -compensated severe systolic dysfunction, etiology unknown but likely idiopathic dilated cardiomyopathy  -prior heavy drinking  -family hx of CAD  -PVC, 9%   -EKG, frequent PVCs, echo LVEF 20%, nuclear scan appears NICM (no CAC as well)    Mr. Pichardo is a 52 year old man with above hx.  Was a little short of breath.  CXR showed cardiomegaly and echo showed severe systolic dysfunction. Placed on diuretic and symptoms resolved.  Was drinking heavily but quit a few weeks ago.  ROS negative except for the above.        Update 11/17/23  Ran out of lasix and went to ER.  Got some more lasix and doing well.  Feels great.  9% pvc burden       Update 12/1/23  Feeling much better, back to walking dogs.      Objective   Vital Signs:  Vitals:    12/01/23 0938   BP: 130/82   Pulse: 64   SpO2: 97%     Estimated body mass index is 27.06 kg/m² as calculated from the following:    Height as of this encounter: 180.3 cm (71\").    Weight as of this encounter: 88 kg (194 lb).       Physical Exam  HENT:      Head: Normocephalic.   Eyes:      Extraocular Movements: Extraocular movements intact.   Cardiovascular:      Rate and Rhythm: Normal rate and regular rhythm.      Heart sounds: No murmur heard.     No gallop.   Pulmonary:      Breath sounds: Normal breath sounds.   Abdominal:      Palpations: Abdomen is soft.   Musculoskeletal:      Right lower leg: No edema.      Left lower leg: No edema.   Skin:     General: Skin is warm and dry.   Neurological:      General: No focal deficit present.      Mental Status: He is alert.   Psychiatric:         Mood and Affect: Mood normal.               Assessment   -compensated severe systolic dysfunction, etiology unknown but likely idiopathic dilated cardiomyopathy  -prior heavy drinking  -family hx of CAD  -PVC, 9%   -EKG, frequent PVCs, echo LVEF 20%, nuclear scan appears NICM (no CAC as " well)    Plan   -on losartan (increase to 100mg), coreg, inspira,  farxiga and aspirin.   -limited echo and coronary CT angiogram  -2 week follow up        Vega Yoder MD  11/02/2023 11:02 EDT

## 2024-01-05 ENCOUNTER — OFFICE VISIT (OUTPATIENT)
Dept: CARDIOLOGY | Facility: CLINIC | Age: 54
End: 2024-01-05
Payer: COMMERCIAL

## 2024-01-05 VITALS
HEART RATE: 60 BPM | DIASTOLIC BLOOD PRESSURE: 82 MMHG | HEIGHT: 71 IN | SYSTOLIC BLOOD PRESSURE: 128 MMHG | OXYGEN SATURATION: 97 % | WEIGHT: 195 LBS | BODY MASS INDEX: 27.3 KG/M2

## 2024-01-05 DIAGNOSIS — I51.9 SYSTOLIC DYSFUNCTION: Primary | ICD-10-CM

## 2024-01-05 RX ORDER — CARVEDILOL 12.5 MG/1
12.5 TABLET ORAL 2 TIMES DAILY
Qty: 180 TABLET | Refills: 3 | Status: SHIPPED | OUTPATIENT
Start: 2024-01-05

## 2024-01-05 NOTE — PROGRESS NOTES
"Chief Complaint  Follow-up (2 week)      Subjective   History of Present Illness    Problem List  -compensated severe systolic dysfunction, etiology unknown but likely idiopathic dilated cardiomyopathy  -prior heavy drinking  -family hx of CAD  -PVC, 9%   -EKG, frequent PVCs, echo LVEF 20%, nuclear scan appears NICM (no CAC as well)    Mr. Pichardo is a 52 year old man with above hx.  Was a little short of breath.  CXR showed cardiomegaly and echo showed severe systolic dysfunction. Placed on diuretic and symptoms resolved.  Was drinking heavily but quit a few weeks ago.  ROS negative except for the above.        Update 11/17/23  Ran out of lasix and went to ER.  Got some more lasix and doing well.  Feels great.  9% pvc burden       Update 12/1/23  Feeling much better, back to walking dogs.      Update 1/5/24  No complaints    Objective   Vital Signs:  Vitals:    01/05/24 0918   BP: 128/82   Pulse: 60   SpO2: 97%     Estimated body mass index is 27.2 kg/m² as calculated from the following:    Height as of this encounter: 180.3 cm (71\").    Weight as of this encounter: 88.5 kg (195 lb).       Physical Exam  HENT:      Head: Normocephalic.   Eyes:      Extraocular Movements: Extraocular movements intact.   Cardiovascular:      Rate and Rhythm: Normal rate and regular rhythm.      Heart sounds: No murmur heard.     No gallop.   Pulmonary:      Breath sounds: Normal breath sounds.   Abdominal:      Palpations: Abdomen is soft.   Musculoskeletal:      Right lower leg: No edema.      Left lower leg: No edema.   Skin:     General: Skin is warm and dry.   Neurological:      General: No focal deficit present.      Mental Status: He is alert.   Psychiatric:         Mood and Affect: Mood normal.               Assessment   -compensated severe systolic dysfunction, etiology unknown but likely idiopathic dilated cardiomyopathy  -prior heavy drinking  -family hx of CAD  -PVC, 9%   -EKG, frequent PVCs, echo LVEF 20%, nuclear scan " appears NICM (no CAC as well)    Plan   -on losartan , coreg (increase to 12.5mg bid), inspira,  farxiga and aspirin.   -coronary CT angiogram  -4 week follow up        Vega Yoder MD  11/02/2023 11:02 EDT

## 2024-01-14 DIAGNOSIS — F32.A DEPRESSION, UNSPECIFIED DEPRESSION TYPE: ICD-10-CM

## 2024-01-17 RX ORDER — CITALOPRAM 20 MG/1
20 TABLET ORAL DAILY
Qty: 90 TABLET | Refills: 0 | Status: SHIPPED | OUTPATIENT
Start: 2024-01-17

## 2024-02-05 ENCOUNTER — OFFICE VISIT (OUTPATIENT)
Dept: CARDIOLOGY | Facility: CLINIC | Age: 54
End: 2024-02-05
Payer: COMMERCIAL

## 2024-02-05 VITALS
SYSTOLIC BLOOD PRESSURE: 98 MMHG | HEART RATE: 75 BPM | BODY MASS INDEX: 28 KG/M2 | DIASTOLIC BLOOD PRESSURE: 60 MMHG | HEIGHT: 71 IN | WEIGHT: 200 LBS | OXYGEN SATURATION: 99 %

## 2024-02-05 DIAGNOSIS — I51.9 SYSTOLIC DYSFUNCTION: Primary | ICD-10-CM

## 2024-02-05 PROCEDURE — 99214 OFFICE O/P EST MOD 30 MIN: CPT | Performed by: INTERNAL MEDICINE

## 2024-02-05 NOTE — PROGRESS NOTES
"Chief Complaint  Systolic dysfunction      Subjective   History of Present Illness    Problem List  -compensated severe systolic dysfunction, etiology unknown but likely idiopathic dilated cardiomyopathy  -prior heavy drinking  -family hx of CAD  -PVC, 9%   -EKG, frequent PVCs, echo LVEF 20%, nuclear scan appears NICM (no CAC as well)    Mr. Pichardo is a 52 year old man with above hx.  Was a little short of breath.  CXR showed cardiomegaly and echo showed severe systolic dysfunction. Placed on diuretic and symptoms resolved.  Was drinking heavily but quit a few weeks ago.  ROS negative except for the above.        Update 11/17/23  Ran out of lasix and went to ER.  Got some more lasix and doing well.  Feels great.  9% pvc burden       Update 12/1/23  Feeling much better, back to walking dogs.      Update 1/5/24  No complaints    Update 2/5/24  No complaints    Objective   Vital Signs:  Vitals:    02/05/24 0926   BP: 98/60   Pulse: 75   SpO2: 99%     Estimated body mass index is 27.89 kg/m² as calculated from the following:    Height as of this encounter: 180.3 cm (71\").    Weight as of this encounter: 90.7 kg (200 lb).       Physical Exam  HENT:      Head: Normocephalic.   Eyes:      Extraocular Movements: Extraocular movements intact.   Cardiovascular:      Rate and Rhythm: Normal rate and regular rhythm.      Heart sounds: No murmur heard.     No gallop.   Pulmonary:      Breath sounds: Normal breath sounds.   Abdominal:      Palpations: Abdomen is soft.   Musculoskeletal:      Right lower leg: No edema.      Left lower leg: No edema.   Skin:     General: Skin is warm and dry.   Neurological:      General: No focal deficit present.      Mental Status: He is alert.   Psychiatric:         Mood and Affect: Mood normal.               Assessment   -compensated severe systolic dysfunction, etiology unknown but likely idiopathic dilated cardiomyopathy  -prior heavy drinking  -family hx of CAD  -PVC, 9%   -EKG, frequent " PVCs, echo LVEF 20%, nuclear scan appears NICM (no CAC as well)    Plan   -on losartan , coreg, inspira,  farxiga and aspirin.   -will consider lipid lowering therapy in future  -coronary CT angiogram and limited echo  -6 week follow up        Vega Yoder MD  11/02/2023 11:02 EDT

## 2024-02-16 ENCOUNTER — OFFICE VISIT (OUTPATIENT)
Dept: FAMILY MEDICINE CLINIC | Facility: CLINIC | Age: 54
End: 2024-02-16
Payer: COMMERCIAL

## 2024-02-16 VITALS
BODY MASS INDEX: 28.95 KG/M2 | SYSTOLIC BLOOD PRESSURE: 110 MMHG | DIASTOLIC BLOOD PRESSURE: 70 MMHG | HEIGHT: 71 IN | WEIGHT: 206.8 LBS | OXYGEN SATURATION: 98 % | HEART RATE: 72 BPM

## 2024-02-16 DIAGNOSIS — Z00.00 ANNUAL PHYSICAL EXAM: Primary | ICD-10-CM

## 2024-02-16 DIAGNOSIS — I42.0 DILATED CARDIOMYOPATHY: ICD-10-CM

## 2024-02-16 DIAGNOSIS — F41.1 GENERALIZED ANXIETY DISORDER: ICD-10-CM

## 2024-02-16 DIAGNOSIS — Z23 NEED FOR TDAP VACCINATION: ICD-10-CM

## 2024-02-16 DIAGNOSIS — Z23 NEED FOR VACCINATION AGAINST STREPTOCOCCUS PNEUMONIAE: ICD-10-CM

## 2024-02-16 DIAGNOSIS — I50.20 HFREF (HEART FAILURE WITH REDUCED EJECTION FRACTION): ICD-10-CM

## 2024-02-20 DIAGNOSIS — F32.A DEPRESSION, UNSPECIFIED DEPRESSION TYPE: ICD-10-CM

## 2024-02-20 NOTE — TELEPHONE ENCOUNTER
Caller: Bj Pichardo    Relationship: Self    Best call back number: [phone]    Requested Prescriptions:   Requested Prescriptions     Pending Prescriptions Disp Refills    citalopram (CeleXA) 20 MG tablet 90 tablet 0     Sig: Take 1 tablet by mouth Daily.        Pharmacy where request should be sent: [facility] [id number]  AT [facility] [address] - [phone]  - [phone] FX     Last office visit with prescribing clinician: 2/16/2024   Last telemedicine visit with prescribing clinician: Visit date not found   Next office visit with prescribing clinician: 8/19/2024     Additional details provided by patient: PT WAS SEEN ON FRI AND  FORGOT TO CALL IN FOR HIM. PT HAS 1 DAY LEFT    Does the patient have less than a 3 day supply:  [x] Yes  [] No    Would you like a call back once the refill request has been completed: [x] Yes [] No    If the office needs to give you a call back, can they leave a voicemail: [] Yes [] No    Tracy Teixeira Rep   02/20/24 15:04 EST

## 2024-02-21 RX ORDER — CITALOPRAM 20 MG/1
20 TABLET ORAL DAILY
Qty: 90 TABLET | Refills: 0 | Status: SHIPPED | OUTPATIENT
Start: 2024-02-21

## 2024-02-26 ENCOUNTER — LAB (OUTPATIENT)
Dept: LAB | Facility: HOSPITAL | Age: 54
End: 2024-02-26
Payer: COMMERCIAL

## 2024-02-27 ENCOUNTER — TELEPHONE (OUTPATIENT)
Dept: INFUSION THERAPY | Facility: HOSPITAL | Age: 54
End: 2024-02-27
Payer: COMMERCIAL

## 2024-02-27 NOTE — TELEPHONE ENCOUNTER
Attempted to contact patient as pre-procedure phone call prior to planned CTA for 2/28/24. Left voicemail with arrival time, location, nothing to eat or drink for 4 hours prior to appointment, okay to take blood pressure medications morning of procedure with a small sip of water.

## 2024-02-28 ENCOUNTER — PATIENT MESSAGE (OUTPATIENT)
Dept: CARDIOLOGY | Facility: CLINIC | Age: 54
End: 2024-02-28
Payer: COMMERCIAL

## 2024-02-28 ENCOUNTER — HOSPITAL ENCOUNTER (OUTPATIENT)
Dept: CT IMAGING | Facility: HOSPITAL | Age: 54
Discharge: HOME | End: 2024-02-28
Payer: COMMERCIAL

## 2024-02-28 VITALS
SYSTOLIC BLOOD PRESSURE: 113 MMHG | TEMPERATURE: 96.9 F | OXYGEN SATURATION: 97 % | HEIGHT: 71 IN | BODY MASS INDEX: 29.06 KG/M2 | DIASTOLIC BLOOD PRESSURE: 82 MMHG | HEART RATE: 59 BPM | WEIGHT: 207.6 LBS | RESPIRATION RATE: 16 BRPM

## 2024-02-28 DIAGNOSIS — I51.9 SYSTOLIC DYSFUNCTION: ICD-10-CM

## 2024-02-28 DIAGNOSIS — E78.00 PURE HYPERCHOLESTEROLEMIA: Primary | ICD-10-CM

## 2024-02-28 DIAGNOSIS — I50.20 HFREF (HEART FAILURE WITH REDUCED EJECTION FRACTION): ICD-10-CM

## 2024-02-28 DIAGNOSIS — Z00.00 ANNUAL PHYSICAL EXAM: ICD-10-CM

## 2024-02-28 DIAGNOSIS — I42.0 DILATED CARDIOMYOPATHY: ICD-10-CM

## 2024-02-28 LAB
ALBUMIN SERPL-MCNC: 4.2 G/DL (ref 3.5–5.2)
ALBUMIN/GLOB SERPL: 1.6 G/DL
ALP SERPL-CCNC: 73 U/L (ref 39–117)
ALT SERPL W P-5'-P-CCNC: 38 U/L (ref 1–41)
ANION GAP SERPL CALCULATED.3IONS-SCNC: 10.6 MMOL/L (ref 5–15)
AST SERPL-CCNC: 35 U/L (ref 1–40)
BILIRUB SERPL-MCNC: 1.3 MG/DL (ref 0–1.2)
BUN SERPL-MCNC: 19 MG/DL (ref 6–20)
BUN/CREAT SERPL: 16 (ref 7–25)
CALCIUM SPEC-SCNC: 9.2 MG/DL (ref 8.6–10.5)
CHLORIDE SERPL-SCNC: 104 MMOL/L (ref 98–107)
CHOLEST SERPL-MCNC: 267 MG/DL (ref 0–200)
CO2 SERPL-SCNC: 24.4 MMOL/L (ref 22–29)
CREAT SERPL-MCNC: 1.19 MG/DL (ref 0.76–1.27)
EGFRCR SERPLBLD CKD-EPI 2021: 74 ML/MIN/1.73
GLOBULIN UR ELPH-MCNC: 2.6 GM/DL
GLUCOSE SERPL-MCNC: 90 MG/DL (ref 65–99)
HDLC SERPL-MCNC: 40 MG/DL (ref 40–60)
LDLC SERPL CALC-MCNC: 166 MG/DL (ref 0–100)
LDLC/HDLC SERPL: 4.08 {RATIO}
POTASSIUM SERPL-SCNC: 4.8 MMOL/L (ref 3.5–5.2)
PROT SERPL-MCNC: 6.8 G/DL (ref 6–8.5)
SODIUM SERPL-SCNC: 139 MMOL/L (ref 136–145)
TRIGL SERPL-MCNC: 320 MG/DL (ref 0–150)
VLDLC SERPL-MCNC: 61 MG/DL (ref 5–40)

## 2024-02-28 PROCEDURE — 25510000001 IOPAMIDOL PER 1 ML: Performed by: INTERNAL MEDICINE

## 2024-02-28 PROCEDURE — 80053 COMPREHEN METABOLIC PANEL: CPT | Performed by: STUDENT IN AN ORGANIZED HEALTH CARE EDUCATION/TRAINING PROGRAM

## 2024-02-28 PROCEDURE — 80061 LIPID PANEL: CPT | Performed by: STUDENT IN AN ORGANIZED HEALTH CARE EDUCATION/TRAINING PROGRAM

## 2024-02-28 PROCEDURE — 75574 CT ANGIO HRT W/3D IMAGE: CPT | Performed by: INTERNAL MEDICINE

## 2024-02-28 PROCEDURE — 75574 CT ANGIO HRT W/3D IMAGE: CPT

## 2024-02-28 RX ORDER — METOPROLOL TARTRATE 1 MG/ML
5 INJECTION, SOLUTION INTRAVENOUS
Status: DISCONTINUED | OUTPATIENT
Start: 2024-02-28 | End: 2024-02-29 | Stop reason: HOSPADM

## 2024-02-28 RX ORDER — LIDOCAINE HYDROCHLORIDE 10 MG/ML
0.5 INJECTION, SOLUTION EPIDURAL; INFILTRATION; INTRACAUDAL; PERINEURAL ONCE AS NEEDED
Status: DISCONTINUED | OUTPATIENT
Start: 2024-02-28 | End: 2024-02-29 | Stop reason: HOSPADM

## 2024-02-28 RX ORDER — SODIUM CHLORIDE 9 MG/ML
40 INJECTION, SOLUTION INTRAVENOUS AS NEEDED
Status: DISCONTINUED | OUTPATIENT
Start: 2024-02-28 | End: 2024-02-29 | Stop reason: HOSPADM

## 2024-02-28 RX ORDER — SODIUM CHLORIDE 0.9 % (FLUSH) 0.9 %
10 SYRINGE (ML) INJECTION AS NEEDED
Status: DISCONTINUED | OUTPATIENT
Start: 2024-02-28 | End: 2024-02-29 | Stop reason: HOSPADM

## 2024-02-28 RX ORDER — EZETIMIBE 10 MG/1
10 TABLET ORAL DAILY
Qty: 90 TABLET | Refills: 3 | Status: SHIPPED | OUTPATIENT
Start: 2024-02-28

## 2024-02-28 RX ORDER — ROSUVASTATIN CALCIUM 20 MG/1
20 TABLET, COATED ORAL DAILY
Qty: 90 TABLET | Refills: 3 | Status: SHIPPED | OUTPATIENT
Start: 2024-02-28

## 2024-02-28 RX ORDER — NITROGLYCERIN 400 UG/1
SPRAY ORAL
Status: DISCONTINUED
Start: 2024-02-28 | End: 2024-02-29 | Stop reason: HOSPADM

## 2024-02-28 RX ORDER — NITROGLYCERIN 400 UG/1
1 SPRAY ORAL
Status: COMPLETED | OUTPATIENT
Start: 2024-02-28 | End: 2024-02-28

## 2024-02-28 RX ORDER — SODIUM CHLORIDE 0.9 % (FLUSH) 0.9 %
10 SYRINGE (ML) INJECTION EVERY 12 HOURS SCHEDULED
Status: DISCONTINUED | OUTPATIENT
Start: 2024-02-28 | End: 2024-02-29 | Stop reason: HOSPADM

## 2024-02-28 RX ORDER — NITROGLYCERIN 400 UG/1
2 SPRAY ORAL
Status: COMPLETED | OUTPATIENT
Start: 2024-02-28 | End: 2024-02-28

## 2024-02-28 RX ORDER — METOPROLOL TARTRATE 100 MG/1
100 TABLET ORAL ONCE
Status: DISCONTINUED | OUTPATIENT
Start: 2024-02-28 | End: 2024-02-29 | Stop reason: HOSPADM

## 2024-02-28 RX ADMIN — IOPAMIDOL 65 ML: 755 INJECTION, SOLUTION INTRAVENOUS at 14:01

## 2024-02-28 RX ADMIN — NITROGLYCERIN 2 SPRAY: 400 SPRAY ORAL at 13:49

## 2024-02-28 NOTE — DISCHARGE INSTRUCTIONS
MAKE SURE TO DRINK PLENTY OF FLUIDS FOR THE NEXT 48 HOURS TO FLUSH THE CONTRAST DYE THROUGH YOUR KIDNEYS TO PROTECT RENAL FUNCTION.

## 2024-02-28 NOTE — PROGRESS NOTES
Mild blockages.  LDL fairly elevated.  Would avoid saturated fat.  Would suggest crestor 10mg and zetia 10mg.  Lipid panel and cmp in 6 weeks

## 2024-03-12 ENCOUNTER — TELEPHONE (OUTPATIENT)
Dept: FAMILY MEDICINE CLINIC | Facility: CLINIC | Age: 54
End: 2024-03-12
Payer: COMMERCIAL

## 2024-03-12 NOTE — TELEPHONE ENCOUNTER
Caller: Bj Pichardo     Relationship:SELF    Callback number: [phone]    Is it ok to leave a message: [x] Yes [] No    Requested medication for samples: dapagliflozin Propanediol (Farxiga) 10 MG tablet     How much medication does the patient currently have left: 10-11 TABLETS     Who will be picking up the samples: PATIENT     Do you need information about patient financial assistance for this medication: [x] Yes [] No    Additional details provided: PATIENT WAS USING [employer] CARD THAT COVERED 100% OF THE COST. PATIENT STATES THEY'RE SYSTEM IS DOWN AND CANNOT BE CONTACTED. THE PHARMACY IS ATTEMPTING TO CHARGE HIM $600 FOR THIS MEDICATION WHICH HE CANNOT AFFORD. PATIENT WOULD LIKE TO REQUEST SAMPLES FOR THIS MEDICATION .

## 2024-03-13 LAB — CREAT BLDA-MCNC: 1.3 MG/DL (ref 0.6–1.3)

## 2024-03-13 NOTE — TELEPHONE ENCOUNTER
Contacted patient informed him samples were ready for pickup downstairs on first floor in patient pickup

## 2024-03-18 ENCOUNTER — OFFICE VISIT (OUTPATIENT)
Dept: CARDIOLOGY | Facility: CLINIC | Age: 54
End: 2024-03-18
Payer: COMMERCIAL

## 2024-03-18 VITALS
HEIGHT: 72 IN | WEIGHT: 202 LBS | DIASTOLIC BLOOD PRESSURE: 60 MMHG | BODY MASS INDEX: 27.36 KG/M2 | HEART RATE: 70 BPM | SYSTOLIC BLOOD PRESSURE: 118 MMHG | OXYGEN SATURATION: 96 %

## 2024-03-18 DIAGNOSIS — I51.9 SYSTOLIC DYSFUNCTION: Primary | ICD-10-CM

## 2024-03-18 PROCEDURE — 99214 OFFICE O/P EST MOD 30 MIN: CPT | Performed by: INTERNAL MEDICINE

## 2024-03-18 NOTE — PROGRESS NOTES
"Chief Complaint  Systolic dysfunction      Subjective   History of Present Illness    Problem List  -compensated severe systolic dysfunction, NICM LVEF 20%  -prior heavy drinking  -family hx of CAD, HLD, mild nocad on CT  -PVC, 9%   -EKG,     Mr. Pichardo is a 52 year old man with above hx.  Was a little short of breath.  CXR showed cardiomegaly and echo showed severe systolic dysfunction. Placed on diuretic and symptoms resolved.  Was drinking heavily but quit a few weeks ago.  ROS negative except for the above.        Update 11/17/23  Ran out of lasix and went to ER.  Got some more lasix and doing well.  Feels great.  9% pvc burden       Update 12/1/23  Feeling much better, back to walking dogs.      Update 1/5/24  No complaints    Update 2/5/24  No complaints    Update 3/18/24  No complaints    Objective   Vital Signs:  Vitals:    03/18/24 0918   BP: 118/60   Pulse: 70   SpO2: 96%     Estimated body mass index is 27.4 kg/m² as calculated from the following:    Height as of this encounter: 182.9 cm (72\").    Weight as of this encounter: 91.6 kg (202 lb).       Physical Exam  HENT:      Head: Normocephalic.   Eyes:      Extraocular Movements: Extraocular movements intact.   Cardiovascular:      Rate and Rhythm: Normal rate and regular rhythm.      Heart sounds: No murmur heard.     No gallop.   Pulmonary:      Breath sounds: Normal breath sounds.   Abdominal:      Palpations: Abdomen is soft.   Musculoskeletal:      Right lower leg: No edema.      Left lower leg: No edema.   Skin:     General: Skin is warm and dry.   Neurological:      General: No focal deficit present.      Mental Status: He is alert.   Psychiatric:         Mood and Affect: Mood normal.     Clinic discussed advanced directive          Assessment   -compensated severe systolic dysfunction, etiology unknown but likely idiopathic dilated cardiomyopathy  -prior heavy drinking  -family hx of CAD  -PVC, 9%   -EKG, frequent PVCs, echo LVEF 20%, nuclear " scan appears NICM (no CAC as well)    Plan   -on losartan , coreg, inspira,  farxiga and aspirin (aspirin probably not necessary may stop in future(.   -cont. Lipid lowering therapy  -limited echo  -12 week follow up        Vega Yoder MD  11/02/2023 11:02 EDT

## 2024-03-22 DIAGNOSIS — F32.A DEPRESSION, UNSPECIFIED DEPRESSION TYPE: ICD-10-CM

## 2024-03-22 RX ORDER — CITALOPRAM 20 MG/1
20 TABLET ORAL DAILY
Qty: 90 TABLET | Refills: 3 | Status: SHIPPED | OUTPATIENT
Start: 2024-03-22

## 2024-03-22 NOTE — TELEPHONE ENCOUNTER
Caller: Marino Bjadolfo Mack    Relationship: Self    Best call back number: [phone]     Requested Prescriptions:   Requested Prescriptions     Pending Prescriptions Disp Refills    citalopram (CeleXA) 20 MG tablet 90 tablet 0     Sig: Take 1 tablet by mouth Daily.        Pharmacy where request should be sent: [facility] [id number]  AT [facility] [address] - [phone]  - [phone]     Last office visit with prescribing clinician: 2/16/2024   Last telemedicine visit with prescribing clinician: Visit date not found   Next office visit with prescribing clinician: 8/19/2024     Additional details provided by patient: OUT OF MEDICATION    Does the patient have less than a 3 day supply:  [x] Yes  [] No    Would you like a call back once the refill request has been completed: [] Yes [x] No    If the office needs to give you a call back, can they leave a voicemail: [] Yes [] No    Tracy Joel Rep   03/22/24 15:07 EDT

## 2024-04-16 ENCOUNTER — TELEPHONE (OUTPATIENT)
Dept: CARDIOLOGY | Facility: CLINIC | Age: 54
End: 2024-04-16
Payer: COMMERCIAL

## 2024-04-16 NOTE — TELEPHONE ENCOUNTER
LEFT MESSAGE WITH [facility] ABOUT RELOCATION TO THE [place] LOCATION. NOTIFIED DATE AND TIME STILL THE SAME.     HUB OK TO MAKE CHANGES 4/16/2024

## 2024-05-07 DIAGNOSIS — I42.0 DILATED CARDIOMYOPATHY: ICD-10-CM

## 2024-05-07 DIAGNOSIS — I50.20 HFREF (HEART FAILURE WITH REDUCED EJECTION FRACTION): ICD-10-CM

## 2024-05-09 RX ORDER — FUROSEMIDE 40 MG/1
40 TABLET ORAL DAILY
Qty: 90 TABLET | Refills: 0 | Status: SHIPPED | OUTPATIENT
Start: 2024-05-09

## 2024-06-06 ENCOUNTER — TELEPHONE (OUTPATIENT)
Dept: CARDIOLOGY | Facility: CLINIC | Age: 54
End: 2024-06-06
Payer: COMMERCIAL

## 2024-06-06 NOTE — TELEPHONE ENCOUNTER
Spoke with patient, he is interested in talking about the [facility] program. Will leave samples at the [place] office for the patient to .

## 2024-06-06 NOTE — TELEPHONE ENCOUNTER
Caller: Bj Pichardo    Relationship: Self    Best call back number: [phone]    What is the best time to reach you: ANYTIME     Who are you requesting to speak with (clinical staff, provider,  specific staff member): ANYONE     What was the call regarding: PATIENT WOULD LIKE A CALL BACK TO DISCUSS HIS FARXIGA 10 MG. REPORTS THAT [facility] IS CHARGING HIM $600 FOR A REFILL AFTER HE APPLIED FOR [employer] PRESCRIPTION CARD. HE HAS AN APPT ON 6.17.24 AND WOULD LIKE TO KNOW IF HE CAN HAVE SOME SAMPLES IN THE MEANTIME.

## 2024-06-06 NOTE — TELEPHONE ENCOUNTER
Spoke with patient, he will bring his documents for us to make copies of when he gets his samples.

## 2024-06-07 ENCOUNTER — SPECIALTY PHARMACY (OUTPATIENT)
Dept: CARDIOLOGY | Facility: CLINIC | Age: 54
End: 2024-06-07
Payer: COMMERCIAL

## 2024-06-10 ENCOUNTER — SPECIALTY PHARMACY (OUTPATIENT)
Dept: CARDIOLOGY | Facility: CLINIC | Age: 54
End: 2024-06-10
Payer: COMMERCIAL

## 2024-06-11 NOTE — PROGRESS NOTES
Patient called [facility] to notify his co-pay at [facility] for Farxiga was over $600. Attempted to run test claim with co-pay card but cost was still over $300 for patient.    Sent PAP application to [employer], however, patient was denied due to having commercial insurance. Also called pharmacy benefits to see about alternatives but all SGLT-2 meds will cost patient several hundred dollars. Not eligible for [employer] due to commerical insurance. Have notified prescriber and patient and will close specialty pharmacy episode at this time.    Of note, upon speaking with Mr. Pichardo he states he is looking into switching insurance carriers. I told him if he does to let specialty pharmacy/cardiology know and we will be happy to try to again.    Cristal Alston, [facility]  Pharmacy Care Coordinator  [facility]  [facility]  [phone]  6/11/2024  15:16 EDT

## 2024-06-17 ENCOUNTER — OFFICE VISIT (OUTPATIENT)
Dept: CARDIOLOGY | Facility: CLINIC | Age: 54
End: 2024-06-17
Payer: COMMERCIAL

## 2024-06-17 VITALS
WEIGHT: 212 LBS | DIASTOLIC BLOOD PRESSURE: 84 MMHG | OXYGEN SATURATION: 97 % | HEIGHT: 72 IN | BODY MASS INDEX: 28.71 KG/M2 | HEART RATE: 67 BPM | SYSTOLIC BLOOD PRESSURE: 124 MMHG

## 2024-06-17 DIAGNOSIS — I51.9 SYSTOLIC DYSFUNCTION: Primary | ICD-10-CM

## 2024-06-17 PROCEDURE — 99214 OFFICE O/P EST MOD 30 MIN: CPT | Performed by: INTERNAL MEDICINE

## 2024-06-17 RX ORDER — DAPAGLIFLOZIN 10 MG/1
10 TABLET, FILM COATED ORAL DAILY
Qty: 7 TABLET | Refills: 4 | COMMUNITY
Start: 2024-06-17 | End: 2024-06-18 | Stop reason: ALTCHOICE

## 2024-06-17 NOTE — PROGRESS NOTES
"Chief Complaint  Systolic dysfunction (3 month follow up)      Subjective   History of Present Illness    Problem List  -compensated severe systolic dysfunction, NICM LVEF 20%  -prior heavy drinking  -family hx of CAD, HLD, mild nocad on CT  -PVC, 9%   -EKG,     Mr. Pichardo is a 52 year old man with above hx.  Was a little short of breath.  CXR showed cardiomegaly and echo showed severe systolic dysfunction. Placed on diuretic and symptoms resolved.  Was drinking heavily but quit a few weeks ago.  ROS negative except for the above.        Update 11/17/23  Ran out of lasix and went to ER.  Got some more lasix and doing well.  Feels great.  9% pvc burden       Update 12/1/23  Feeling much better, back to walking dogs.      Update 1/5/24  No complaints    Update 2/5/24  No complaints    Update 3/18/24  No complaints    Update 6/17/24  No complaints.      Objective   Vital Signs:  Vitals:    06/17/24 0929   BP: 124/84   Pulse: 67   SpO2: 97%     Estimated body mass index is 28.75 kg/m² as calculated from the following:    Height as of this encounter: 182.9 cm (72\").    Weight as of this encounter: 96.2 kg (212 lb).       Physical Exam  HENT:      Head: Normocephalic.   Eyes:      Extraocular Movements: Extraocular movements intact.   Cardiovascular:      Rate and Rhythm: Normal rate and regular rhythm.      Heart sounds: No murmur heard.     No gallop.   Pulmonary:      Breath sounds: Normal breath sounds.   Abdominal:      Palpations: Abdomen is soft.   Musculoskeletal:      Right lower leg: No edema.      Left lower leg: No edema.   Skin:     General: Skin is warm and dry.   Neurological:      General: No focal deficit present.      Mental Status: He is alert.   Psychiatric:         Mood and Affect: Mood normal.     Clinic discussed advanced directive          Assessment   -compensated severe systolic dysfunction, etiology unknown but likely idiopathic dilated cardiomyopathy  -prior heavy drinking  -family hx of " CAD  -PVC, 9%   -EKG, frequent PVCs, echo LVEF 20%, nuclear scan appears NICM (no CAC as well)    Plan   -on losartan , coreg, inspira,  farxiga and aspirin (aspirin probably not necessary may stop in future).   -cont. Lipid lowering therapy  -limited echo, having issues with how expensive.  Arranging with Dr. Hastings's group  -4 week follow up        Vega Yoder MD  11/02/2023 11:02 EDT

## 2024-06-18 ENCOUNTER — SPECIALTY PHARMACY (OUTPATIENT)
Dept: CARDIOLOGY | Facility: CLINIC | Age: 54
End: 2024-06-18
Payer: COMMERCIAL

## 2024-06-18 RX ORDER — SOTAGLIFLOZIN 200 MG/1
200 TABLET ORAL DAILY
Qty: 30 TABLET | Refills: 11 | Status: SHIPPED | OUTPATIENT
Start: 2024-06-18

## 2024-07-15 ENCOUNTER — SPECIALTY PHARMACY (OUTPATIENT)
Dept: CARDIOLOGY | Facility: CLINIC | Age: 54
End: 2024-07-15
Payer: COMMERCIAL

## 2024-07-15 NOTE — PROGRESS NOTES
Specialty Pharmacy Refill Coordination Note     Bj is a 53 y.o. male contacted today regarding refills of Inpefa 200 mg PO once daily specialty medication(s).    Patient will  today at [facility] around 1100.    Specialty medication(s) and dose(s) confirmed: yes    Refill Questions      Flowsheet Row Most Recent Value   Changes to allergies? No   Changes to medications? No   New conditions or infections since last clinic visit No   Unplanned office visit, urgent care, ED, or hospital admission in the last 4 weeks  No   How does patient/caregiver feel medication is working? Good   Financial problems or insurance changes  No   Since the previous refill, were any specialty medication doses or scheduled injections missed or delayed?  No   Does this patient require a clinical escalation to a pharmacist? No            Delivery Questions      Flowsheet Row Most Recent Value   Delivery method  at Pharmacy   Number of medications in delivery 1   Medication(s) being filled and delivered Sotagliflozin   Doses left of specialty medications about 6 days   Copay verified? Yes   Copay amount $21.84   Copay form of payment Pay at pickup   Ship Date n/a,  pt picking up   Delivery Date n/a,  pt picking up   Signature Required No                   Follow-up: 30 day(s)     Cristal Alston, Pharmacy Technician  Specialty Pharmacy Technician

## 2024-07-18 ENCOUNTER — OFFICE VISIT (OUTPATIENT)
Dept: CARDIOLOGY | Facility: CLINIC | Age: 54
End: 2024-07-18
Payer: COMMERCIAL

## 2024-07-18 ENCOUNTER — LAB (OUTPATIENT)
Facility: HOSPITAL | Age: 54
End: 2024-07-18
Payer: COMMERCIAL

## 2024-07-18 VITALS
BODY MASS INDEX: 29.3 KG/M2 | HEART RATE: 69 BPM | SYSTOLIC BLOOD PRESSURE: 110 MMHG | WEIGHT: 216.3 LBS | HEIGHT: 72 IN | DIASTOLIC BLOOD PRESSURE: 72 MMHG | OXYGEN SATURATION: 96 %

## 2024-07-18 DIAGNOSIS — I51.9 SYSTOLIC DYSFUNCTION: Primary | ICD-10-CM

## 2024-07-18 DIAGNOSIS — I51.9 SYSTOLIC DYSFUNCTION: ICD-10-CM

## 2024-07-18 LAB
BASOPHILS # BLD AUTO: 0.05 10*3/MM3 (ref 0–0.2)
BASOPHILS NFR BLD AUTO: 0.9 % (ref 0–1.5)
DEPRECATED RDW RBC AUTO: 41.7 FL (ref 37–54)
EOSINOPHIL # BLD AUTO: 0.25 10*3/MM3 (ref 0–0.4)
EOSINOPHIL NFR BLD AUTO: 4.5 % (ref 0.3–6.2)
ERYTHROCYTE [DISTWIDTH] IN BLOOD BY AUTOMATED COUNT: 12.1 % (ref 12.3–15.4)
HCT VFR BLD AUTO: 42.9 % (ref 37.5–51)
HGB BLD-MCNC: 15 G/DL (ref 13–17.7)
IMM GRANULOCYTES # BLD AUTO: 0.01 10*3/MM3 (ref 0–0.05)
IMM GRANULOCYTES NFR BLD AUTO: 0.2 % (ref 0–0.5)
LYMPHOCYTES # BLD AUTO: 1.35 10*3/MM3 (ref 0.7–3.1)
LYMPHOCYTES NFR BLD AUTO: 24.5 % (ref 19.6–45.3)
MCH RBC QN AUTO: 33.2 PG (ref 26.6–33)
MCHC RBC AUTO-ENTMCNC: 35 G/DL (ref 31.5–35.7)
MCV RBC AUTO: 94.9 FL (ref 79–97)
MONOCYTES # BLD AUTO: 0.41 10*3/MM3 (ref 0.1–0.9)
MONOCYTES NFR BLD AUTO: 7.5 % (ref 5–12)
NEUTROPHILS NFR BLD AUTO: 3.43 10*3/MM3 (ref 1.7–7)
NEUTROPHILS NFR BLD AUTO: 62.4 % (ref 42.7–76)
NRBC BLD AUTO-RTO: 0 /100 WBC (ref 0–0.2)
PLATELET # BLD AUTO: 195 10*3/MM3 (ref 140–450)
PMV BLD AUTO: 10.4 FL (ref 6–12)
RBC # BLD AUTO: 4.52 10*6/MM3 (ref 4.14–5.8)
WBC NRBC COR # BLD AUTO: 5.5 10*3/MM3 (ref 3.4–10.8)

## 2024-07-18 PROCEDURE — 36415 COLL VENOUS BLD VENIPUNCTURE: CPT

## 2024-07-18 PROCEDURE — 80061 LIPID PANEL: CPT

## 2024-07-18 PROCEDURE — 86141 C-REACTIVE PROTEIN HS: CPT

## 2024-07-18 PROCEDURE — 80053 COMPREHEN METABOLIC PANEL: CPT

## 2024-07-18 PROCEDURE — 85025 COMPLETE CBC W/AUTO DIFF WBC: CPT

## 2024-07-18 NOTE — PROGRESS NOTES
"Chief Complaint  Systolic dysfunction      Subjective   History of Present Illness    Problem List  - was compensated severe systolic dysfunction, NICM LVEF 20%.  Now LVEF 50% on recent study.    -prior heavy drinking  -family hx of CAD, HLD, mild nocad on CT (CAC 5)  -PVC, 9%   -EKG, LVH    Mr. Pichardo is a 52 year old man with above hx.  Was a little short of breath.  CXR showed cardiomegaly and echo showed severe systolic dysfunction. Placed on diuretic and symptoms resolved.  Was drinking heavily but quit a few weeks ago.  ROS negative except for the above.        Update 11/17/23  Ran out of lasix and went to ER.  Got some more lasix and doing well.  Feels great.  9% pvc burden       Update 12/1/23  Feeling much better, back to walking dogs.      Update 1/5/24  No complaints    Update 2/5/24  No complaints    Update 3/18/24  No complaints    Update 6/17/24  No complaints.      Update 7/18/24  Feeling much better. EF has essentially normalized    Objective   Vital Signs:  Vitals:    07/18/24 1038   BP: 110/72   Pulse: 69   SpO2: 96%     Estimated body mass index is 29.33 kg/m² as calculated from the following:    Height as of this encounter: 182.9 cm (72.01\").    Weight as of this encounter: 98.1 kg (216 lb 4.8 oz).       Physical Exam  HENT:      Head: Normocephalic.   Eyes:      Extraocular Movements: Extraocular movements intact.   Cardiovascular:      Rate and Rhythm: Normal rate and regular rhythm.      Heart sounds: No murmur heard.     No gallop.   Pulmonary:      Breath sounds: Normal breath sounds.   Abdominal:      Palpations: Abdomen is soft.   Musculoskeletal:      Right lower leg: No edema.      Left lower leg: No edema.   Skin:     General: Skin is warm and dry.   Neurological:      General: No focal deficit present.      Mental Status: He is alert.   Psychiatric:         Mood and Affect: Mood normal.     Clinic discussed advanced directive          Assessment   - was compensated severe systolic " dysfunction, NICM LVEF 20%.  Now LVEF 50% on recent study.    -prior heavy drinking  -family hx of CAD, HLD, mild nocad on CT (CAC 5)  -PVC, 9%   -EKG, LVH    Plan   -on losartan , coreg, inspira, sotagliflozin  and aspirin (aspirin probably not necessary may stop in future).   -cont. Lipid lowering therapy  -blood work, 6 month follow up        Vega Yoder MD  11/02/2023 11:02 EDT

## 2024-07-19 LAB
ALBUMIN SERPL-MCNC: 4.4 G/DL (ref 3.5–5.2)
ALBUMIN/GLOB SERPL: 1.6 G/DL
ALP SERPL-CCNC: 80 U/L (ref 39–117)
ALT SERPL W P-5'-P-CCNC: 52 U/L (ref 1–41)
ANION GAP SERPL CALCULATED.3IONS-SCNC: 9.9 MMOL/L (ref 5–15)
AST SERPL-CCNC: 35 U/L (ref 1–40)
BILIRUB SERPL-MCNC: 1.1 MG/DL (ref 0–1.2)
BUN SERPL-MCNC: 13 MG/DL (ref 6–20)
BUN/CREAT SERPL: 10 (ref 7–25)
CALCIUM SPEC-SCNC: 9.3 MG/DL (ref 8.6–10.5)
CHLORIDE SERPL-SCNC: 105 MMOL/L (ref 98–107)
CHOLEST SERPL-MCNC: 115 MG/DL (ref 0–200)
CO2 SERPL-SCNC: 25.1 MMOL/L (ref 22–29)
CREAT SERPL-MCNC: 1.3 MG/DL (ref 0.76–1.27)
CRP SERPL-MCNC: 0.09 MG/DL (ref 0.01–0.5)
EGFRCR SERPLBLD CKD-EPI 2021: 66.5 ML/MIN/1.73
GLOBULIN UR ELPH-MCNC: 2.7 GM/DL
GLUCOSE SERPL-MCNC: 119 MG/DL (ref 65–99)
HDLC SERPL-MCNC: 41 MG/DL (ref 40–60)
LDLC SERPL CALC-MCNC: 49 MG/DL (ref 0–100)
LDLC/HDLC SERPL: 1.11 {RATIO}
POTASSIUM SERPL-SCNC: 4.2 MMOL/L (ref 3.5–5.2)
PROT SERPL-MCNC: 7.1 G/DL (ref 6–8.5)
SODIUM SERPL-SCNC: 140 MMOL/L (ref 136–145)
TRIGL SERPL-MCNC: 142 MG/DL (ref 0–150)
VLDLC SERPL-MCNC: 25 MG/DL (ref 5–40)

## 2024-07-23 ENCOUNTER — TELEPHONE (OUTPATIENT)
Dept: CARDIOLOGY | Facility: CLINIC | Age: 54
End: 2024-07-23
Payer: COMMERCIAL

## 2024-08-13 ENCOUNTER — SPECIALTY PHARMACY (OUTPATIENT)
Dept: CARDIOLOGY | Facility: CLINIC | Age: 54
End: 2024-08-13
Payer: COMMERCIAL

## 2024-08-16 ENCOUNTER — TELEPHONE (OUTPATIENT)
Dept: CARDIOLOGY | Facility: CLINIC | Age: 54
End: 2024-08-16
Payer: COMMERCIAL

## 2024-08-16 NOTE — TELEPHONE ENCOUNTER
Inpefa co-pay card is not working properly. Called [employer] and it is a glitch on their end that they are aware of and will work on.    In the meantime, patient will come by Monday to [facility] and  free samples of the Inpefa.    Please direct patient to Curly Crum or Cristal Alston.    Cristal Alston [facility]  Pharmacy Care Coordinator  [facility]  [facility]  [phone]  8/16/2024  10:44 EDT

## 2024-08-19 ENCOUNTER — OFFICE VISIT (OUTPATIENT)
Dept: FAMILY MEDICINE CLINIC | Facility: CLINIC | Age: 54
End: 2024-08-19
Payer: COMMERCIAL

## 2024-08-19 VITALS
OXYGEN SATURATION: 99 % | BODY MASS INDEX: 29.12 KG/M2 | DIASTOLIC BLOOD PRESSURE: 80 MMHG | HEART RATE: 73 BPM | HEIGHT: 72 IN | SYSTOLIC BLOOD PRESSURE: 130 MMHG | WEIGHT: 215 LBS

## 2024-08-19 DIAGNOSIS — U07.1 COVID-19 VIRUS DETECTED: ICD-10-CM

## 2024-08-19 DIAGNOSIS — J02.9 SORE THROAT: ICD-10-CM

## 2024-08-19 DIAGNOSIS — I42.0 DILATED CARDIOMYOPATHY: ICD-10-CM

## 2024-08-19 DIAGNOSIS — F32.9 REACTIVE DEPRESSION: ICD-10-CM

## 2024-08-19 DIAGNOSIS — R52 BODY ACHES: ICD-10-CM

## 2024-08-19 DIAGNOSIS — I50.20 HFREF (HEART FAILURE WITH REDUCED EJECTION FRACTION): Primary | ICD-10-CM

## 2024-08-19 DIAGNOSIS — F41.1 GENERALIZED ANXIETY DISORDER: ICD-10-CM

## 2024-08-19 LAB
EXPIRATION DATE: ABNORMAL
FLUAV AG UPPER RESP QL IA.RAPID: NOT DETECTED
FLUBV AG UPPER RESP QL IA.RAPID: NOT DETECTED
INTERNAL CONTROL: ABNORMAL
Lab: ABNORMAL
SARS-COV-2 AG UPPER RESP QL IA.RAPID: DETECTED

## 2024-08-19 PROCEDURE — 99214 OFFICE O/P EST MOD 30 MIN: CPT | Performed by: STUDENT IN AN ORGANIZED HEALTH CARE EDUCATION/TRAINING PROGRAM

## 2024-08-19 PROCEDURE — 87428 SARSCOV & INF VIR A&B AG IA: CPT | Performed by: STUDENT IN AN ORGANIZED HEALTH CARE EDUCATION/TRAINING PROGRAM

## 2024-08-19 RX ORDER — CITALOPRAM 40 MG/1
40 TABLET ORAL DAILY
Qty: 90 TABLET | Refills: 0 | Status: SHIPPED | OUTPATIENT
Start: 2024-08-19

## 2024-08-19 NOTE — ASSESSMENT & PLAN NOTE
Mood symptoms suboptimally controlled  Increase Celexa to 40mg  FU in 1 month  Encouraged pt to find hobbies, activities he enjoys  Consider counseling referral in future

## 2024-08-19 NOTE — ASSESSMENT & PLAN NOTE
Management per Cardiology  Cont GDMT  Recent echo showed improved EF to 50%  Discussed low Na diet, importance of physical activity and healthy weight

## 2024-08-19 NOTE — PROGRESS NOTES
Chief Complaint   Patient presents with    HFrEF (heart failure with reduced ejection fraction)     6 MONTH FOLLOW UP        HPI:  Bj Pichardo is a 53 y.o. male who presents today for chronic care follow up.     Friday night pt started having symptoms of sore throat, body aches, low energy. Brother had similar symptoms last week. Denies cough, shortness of breath.     Feels tired, run down at times.  Has felt this way for several months.  No improvement despite improving his cardiac conditions.  Wonders if there could be a mood component.  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  Mood symptoms have been well-controlled since his divorce.  He was started on Celexa a few months ago and initially noticed some improvement with this.  Wonders if it could be increased.    Pt has followed closely with Cardiology for HFrEF and dilated cardiomyopathy presumed 2/2 alcohol use. Recent echo showed improvement in EF to 50% (from 20%). Has strong family h/o CAD. ON GDMT  with Eplerenone, Lasix, Losartan, Sotagliflozin, ASA, Coreg, Zetia and Crestor. Recent lipids under excellent control.    Wants to talk about sweating heavily at FU visit.    PE:  Vitals:    08/19/24 0934   BP: 130/80   Pulse: 73   SpO2: 99%      Body mass index is 29.15 kg/m².    Gen Appearance: NAD  HEENT: Normocephalic, PERRL, no thyromegaly, trachea midline  Heart: RRR, normal S1 and S2, no murmur  Lungs: CTA b/l, no wheezing, no crackles  MSK: Moves all extremities well, normal gait, no peripheral edema  Neuro: No focal deficits    Current Outpatient Medications   Medication Sig Dispense Refill    aspirin 81 MG chewable tablet Chew 1 tablet Daily.      carvedilol (COREG) 12.5 MG tablet Take 1 tablet by mouth 2 (Two) Times a Day. 180 tablet 3    citalopram (CeleXA) 40 MG tablet Take 1 tablet by mouth Daily. 90 tablet 0    eplerenone (INSPRA) 25 MG tablet Take 0.5 tablets by mouth  Daily. 45 tablet 3    ezetimibe (ZETIA) 10 MG tablet Take 1 tablet by mouth Daily. 90 tablet 3    furosemide (LASIX) 40 MG tablet TAKE 1 TABLET BY MOUTH DAILY 90 tablet 0    losartan (Cozaar) 100 MG tablet Take 1 tablet by mouth Daily. 90 tablet 3    rosuvastatin (CRESTOR) 20 MG tablet Take 1 tablet by mouth Daily. 90 tablet 3    Sotagliflozin (Inpefa) 200 MG tablet Take 1 tablet by mouth Daily. 30 tablet 11    Nirmatrelvir & Ritonavir, 300mg/100mg, (PAXLOVID) Take 3 tablets by mouth 2 (Two) Times a Day for 5 days. 30 tablet 0     No current facility-administered medications for this visit.        A/P:  Diagnoses and all orders for this visit:    1. HFrEF (heart failure with reduced ejection fraction) (Primary)  Assessment & Plan:  Management per Cardiology  Cont GDMT  Recent echo showed improved EF to 50%  Discussed low Na diet, importance of physical activity and healthy weight         2. Dilated cardiomyopathy    3. Reactive depression  Assessment & Plan:  Mood symptoms suboptimally controlled  Increase Celexa to 40mg  FU in 1 month  Encouraged pt to find hobbies, activities he enjoys  Consider counseling referral in future    Orders:  -     citalopram (CeleXA) 40 MG tablet; Take 1 tablet by mouth Daily.  Dispense: 90 tablet; Refill: 0    4. Generalized anxiety disorder  Assessment & Plan:  Suboptimally controlled  Increase Celexa to 40mg  FU in 1 month      5. COVID-19 virus detected  Patient is considered high risk for complications related to COVID-19 given underlying cardiac conditions.  Prescription for Paxlovid was sent to pharmacy.  Patient was advised to hold his eplerenone and his rosuvastatin while taking the Paxlovid.  Advised to ask pharmacist to review for any other medication interactions.  Reviewed common side effects.  Return to activity guidelines were reviewed.  -     Nirmatrelvir & Ritonavir, 300mg/100mg, (PAXLOVID); Take 3 tablets by mouth 2 (Two) Times a Day for 5 days.  Dispense: 30 tablet;  Refill: 0    6. Sore throat  -     POCT SARS-CoV-2 + Flu Antigen YVETTE    7. Body aches  -     POCT SARS-CoV-2 + Flu Antigen YVETTE         Return in about 4 weeks (around 9/16/2024) for mood/med check.     Dictated Utilizing Dragon Dictation    Please note that portions of this note were completed with a voice recognition program.    Part of this note may be an electronic transcription/translation of spoken language to printed text using the Dragon Dictation System.

## 2024-08-26 ENCOUNTER — SPECIALTY PHARMACY (OUTPATIENT)
Dept: CARDIOLOGY | Facility: CLINIC | Age: 54
End: 2024-08-26
Payer: COMMERCIAL

## 2024-08-26 NOTE — PROGRESS NOTES
Specialty Pharmacy Refill Coordination Note     Bj is a 53 y.o. male contacted today regarding refills of Inpefa 200 mg PO once daily specialty medication(s).    Patient will  this week at [facility] retail pharmacy.    Specialty medication(s) and dose(s) confirmed: yes    Refill Questions      Flowsheet Row Most Recent Value   Changes to allergies? No   Changes to medications? No   New conditions or infections since last clinic visit No   Unplanned office visit, urgent care, ED, or hospital admission in the last 4 weeks  No   How does patient/caregiver feel medication is working? Very good   Financial problems or insurance changes  No   Since the previous refill, were any specialty medication doses or scheduled injections missed or delayed?  No   Does this patient require a clinical escalation to a pharmacist? No            Delivery Questions      Flowsheet Row Most Recent Value   Delivery method  at Pharmacy   Number of medications in delivery 1   Medication(s) being filled and delivered Sotagliflozin   Doses left of specialty medications 5 days   Copay verified? Yes   Copay amount $21.84   Copay form of payment Pay at pickup   Ship Date n/a   Delivery Date n/a   Signature Required No                   Follow-up: 30 day(s)     Cristal Alston, Pharmacy Technician  Specialty Pharmacy Technician

## 2024-09-07 ENCOUNTER — TELEPHONE (OUTPATIENT)
Dept: FAMILY MEDICINE CLINIC | Facility: CLINIC | Age: 54
End: 2024-09-07
Payer: COMMERCIAL

## 2024-09-07 DIAGNOSIS — I50.20 HFREF (HEART FAILURE WITH REDUCED EJECTION FRACTION): ICD-10-CM

## 2024-09-07 DIAGNOSIS — I42.0 DILATED CARDIOMYOPATHY: ICD-10-CM

## 2024-09-07 RX ORDER — FUROSEMIDE 40 MG
40 TABLET ORAL DAILY
Qty: 90 TABLET | Refills: 0 | Status: SHIPPED | OUTPATIENT
Start: 2024-09-07

## 2024-09-07 NOTE — TELEPHONE ENCOUNTER
Patient needs a refill for his Lasix. Saw in chart that it has already been put in and submitted by [employer].

## 2024-09-17 ENCOUNTER — OFFICE VISIT (OUTPATIENT)
Dept: FAMILY MEDICINE CLINIC | Facility: CLINIC | Age: 54
End: 2024-09-17
Payer: COMMERCIAL

## 2024-09-17 VITALS
OXYGEN SATURATION: 96 % | BODY MASS INDEX: 29.12 KG/M2 | HEIGHT: 72 IN | SYSTOLIC BLOOD PRESSURE: 124 MMHG | WEIGHT: 215 LBS | DIASTOLIC BLOOD PRESSURE: 88 MMHG | HEART RATE: 57 BPM

## 2024-09-17 DIAGNOSIS — M79.603 MUSCULOSKELETAL PAIN OF UPPER EXTREMITY, UNSPECIFIED LATERALITY: ICD-10-CM

## 2024-09-17 DIAGNOSIS — F41.1 GENERALIZED ANXIETY DISORDER: Primary | ICD-10-CM

## 2024-09-17 DIAGNOSIS — F32.9 REACTIVE DEPRESSION: ICD-10-CM

## 2024-09-17 PROCEDURE — 99214 OFFICE O/P EST MOD 30 MIN: CPT | Performed by: STUDENT IN AN ORGANIZED HEALTH CARE EDUCATION/TRAINING PROGRAM

## 2024-09-20 ENCOUNTER — SPECIALTY PHARMACY (OUTPATIENT)
Dept: CARDIOLOGY | Facility: CLINIC | Age: 54
End: 2024-09-20
Payer: COMMERCIAL

## 2024-09-25 ENCOUNTER — SPECIALTY PHARMACY (OUTPATIENT)
Dept: CARDIOLOGY | Facility: CLINIC | Age: 54
End: 2024-09-25
Payer: COMMERCIAL

## 2024-10-25 ENCOUNTER — SPECIALTY PHARMACY (OUTPATIENT)
Dept: CARDIOLOGY | Facility: CLINIC | Age: 54
End: 2024-10-25
Payer: COMMERCIAL

## 2024-10-25 NOTE — PROGRESS NOTES
Specialty Pharmacy Refill Coordination Note     Bj is a 53 y.o. male contacted today regarding refills of Inpefa 200 mg PO once daily specialty medication(s).    Patient did not need medication refilled today. He asked for a follow up call in 7 days.    Specialty medication(s) and dose(s) confirmed: yes    Refill Questions      Flowsheet Row Most Recent Value   Changes to allergies? No   Changes to medications? No   New conditions or infections since last clinic visit No   Unplanned office visit, urgent care, ED, or hospital admission in the last 4 weeks  No   How does patient/caregiver feel medication is working? Good   Financial problems or insurance changes  No   Since the previous refill, were any specialty medication doses or scheduled injections missed or delayed?  No   Does this patient require a clinical escalation to a pharmacist? No            Delivery Questions      Flowsheet Row Most Recent Value   Delivery method Other (Comment)   Delivery address verified with patient/caregiver? No   Delivery address Other (Comment)   Number of medications in delivery 0   Medication(s) being filled and delivered Sotagliflozin (Inpefa)   Doses left of specialty medications 10-14 pills   Copay verified? No   Copay amount n/a,  medication NOT refilled today   Copay form of payment No copayment ($0)   Ship Date n/a,  medication NOT refilled today   Delivery Date n/a,  medication NOT refilled today   Signature Required No                   Follow-up: 7 day(s)     Cristal Alston, Pharmacy Technician  Specialty Pharmacy Technician

## 2024-11-04 ENCOUNTER — SPECIALTY PHARMACY (OUTPATIENT)
Dept: CARDIOLOGY | Facility: CLINIC | Age: 54
End: 2024-11-04
Payer: COMMERCIAL

## 2024-11-04 RX ORDER — EPLERENONE 25 MG/1
12.5 TABLET, FILM COATED ORAL DAILY
Qty: 15 TABLET | Refills: 3 | Status: SHIPPED | OUTPATIENT
Start: 2024-11-04

## 2024-11-04 NOTE — PROGRESS NOTES
Specialty Pharmacy Refill Coordination Note     Bj is a 53 y.o. male contacted today regarding refills of Inpefa 200 mg PO once daily specialty medication(s).    Patient will  at [facility] later today.    Specialty medication(s) and dose(s) confirmed: yes    Refill Questions      Flowsheet Row Most Recent Value   Changes to allergies? No   Changes to medications? No   New conditions or infections since last clinic visit No   Unplanned office visit, urgent care, ED, or hospital admission in the last 4 weeks  No   How does patient/caregiver feel medication is working? Very good   Financial problems or insurance changes  No   Since the previous refill, were any specialty medication doses or scheduled injections missed or delayed?  No   Does this patient require a clinical escalation to a pharmacist? No            Delivery Questions      Flowsheet Row Most Recent Value   Delivery method  at Pharmacy   Number of medications in delivery 1   Medication(s) being filled and delivered Sotagliflozin (Inpefa)   Doses left of specialty medications 3-5 days   Copay verified? Yes   Copay amount 21.84   Copay form of payment Pay at pickup   Ship Date n/a   Delivery Date n/a   Signature Required No                   Follow-up: 30 day(s)     Cristal Alston, Pharmacy Technician  Specialty Pharmacy Technician

## 2024-11-16 DIAGNOSIS — F32.9 REACTIVE DEPRESSION: ICD-10-CM

## 2024-11-16 RX ORDER — CITALOPRAM HYDROBROMIDE 40 MG/1
40 TABLET ORAL DAILY
Qty: 90 TABLET | Refills: 0 | Status: SHIPPED | OUTPATIENT
Start: 2024-11-16

## 2024-11-27 ENCOUNTER — SPECIALTY PHARMACY (OUTPATIENT)
Dept: CARDIOLOGY | Facility: CLINIC | Age: 54
End: 2024-11-27
Payer: COMMERCIAL

## 2024-11-27 NOTE — PROGRESS NOTES
Specialty Pharmacy Refill Coordination Note     Bj is a 53 y.o. male contacted today regarding refills of Inpefa 200 mg PO once daily specialty medication(s).    Patient will  at [facility] on Friday, 11/29.    Specialty medication(s) and dose(s) confirmed: yes    Refill Questions      Flowsheet Row Most Recent Value   Changes to allergies? No   Changes to medications? No   New conditions or infections since last clinic visit No   Unplanned office visit, urgent care, ED, or hospital admission in the last 4 weeks  No   How does patient/caregiver feel medication is working? Very good   Financial problems or insurance changes  No   Since the previous refill, were any specialty medication doses or scheduled injections missed or delayed?  No   Does this patient require a clinical escalation to a pharmacist? No            Delivery Questions      Flowsheet Row Most Recent Value   Delivery method  at Pharmacy   Delivery address verified with patient/caregiver? No   Delivery address Other (Comment)   Number of medications in delivery 1   Medication(s) being filled and delivered Sotagliflozin (Inpefa)   Doses left of specialty medications about 7-10 days   Copay verified? Yes   Copay amount 21.84   Copay form of payment Pay at pickup   Ship Date n/a   Delivery Date n/a   Signature Required No                   Follow-up: 30 day(s)     Cristal Alston, Pharmacy Technician  Specialty Pharmacy Technician

## 2024-12-05 RX ORDER — LOSARTAN POTASSIUM 100 MG/1
100 TABLET ORAL DAILY
Qty: 30 TABLET | Refills: 11 | Status: SHIPPED | OUTPATIENT
Start: 2024-12-05

## 2024-12-09 ENCOUNTER — SPECIALTY PHARMACY (OUTPATIENT)
Dept: GENERAL RADIOLOGY | Facility: HOSPITAL | Age: 54
End: 2024-12-09
Payer: COMMERCIAL

## 2024-12-09 DIAGNOSIS — I42.0 DILATED CARDIOMYOPATHY: ICD-10-CM

## 2024-12-09 DIAGNOSIS — I50.20 HFREF (HEART FAILURE WITH REDUCED EJECTION FRACTION): ICD-10-CM

## 2024-12-09 RX ORDER — FUROSEMIDE 40 MG/1
40 TABLET ORAL DAILY
Qty: 90 TABLET | Refills: 0 | Status: SHIPPED | OUTPATIENT
Start: 2024-12-09

## 2024-12-09 NOTE — PROGRESS NOTES
Specialty Pharmacy Patient Management Program  Cardiology Reassessment     Bj Pichardo was referred by a Cardiology provider to the Cardiology Patient Management program offered by [facility] for Heart Failure. A follow-up outreach was conducted, including assessment of continued therapy appropriateness, medication adherence, and side effect incidence and management for Inpefa.    Changes to Insurance Coverage or Financial Support  No changes    Relevant Past Medical History and Comorbidities  Relevant medical history and concomitant health conditions were discussed with the patient. The patient's chart has been reviewed for relevant past medical history and comorbid health conditions and updated as necessary.   Past Medical History:   Diagnosis Date    Abnormal ECG 10-27-23    CHF (congestive heart failure) 10-7-03    Depression 2017?    Hypertension 10-20-23    Kidney stone      Social History     Socioeconomic History    Marital status: Single   Tobacco Use    Smoking status: Never     Passive exposure: Never    Smokeless tobacco: Never   Vaping Use    Vaping status: Never Used   Substance and Sexual Activity    Alcohol use: Not Currently     Comment: stopped drinking    Drug use: Not Currently     Types: Marijuana     Comment: quit    Sexual activity: Not Currently     Partners: Female     Problem list reviewed by Leeann Putnam, PharmD on 12/9/2024 at 10:49 AM    Hospitalizations and Urgent Care Since Last Assessment  ED Visits, Admissions, or Hospitalizations: None  Urgent Office Visits: None    Allergies  Known allergies and reactions were discussed with the patient. The patient's chart has been reviewed for allergy information and updated as necessary.   No Known Allergies  Allergies reviewed by Leeann Putnam, PharmD on 12/9/2024 at 10:49 AM    Relevant Laboratory Values  Relevant laboratory values were discussed with the patient. The following specialty medication  dose adjustment(s) are recommended: No changes    Lab Results   Component Value Date    GLUCOSE 119 (H) 07/18/2024    CALCIUM 9.3 07/18/2024     07/18/2024    K 4.2 07/18/2024    CO2 25.1 07/18/2024     07/18/2024    BUN 13 07/18/2024    CREATININE 1.30 (H) 07/18/2024    BCR 10.0 07/18/2024    ANIONGAP 9.9 07/18/2024     Lab Results   Component Value Date    CHOL 115 07/18/2024    TRIG 142 07/18/2024    HDL 41 07/18/2024    LDL 49 07/18/2024       Current Medication List  This medication list has been reviewed with the patient and evaluated for any interactions or necessary modifications/recommendations, and updated to include all prescription medications, OTC medications, and supplements the patient is currently taking.  This list reflects what is contained in the patient's profile, which has also been marked as reviewed to communicate to other providers it is the most up to date version of the patient's current medication therapy.     Current Outpatient Medications:     aspirin 81 MG chewable tablet, Chew 1 tablet Daily., Disp: , Rfl:     carvedilol (COREG) 12.5 MG tablet, Take 1 tablet by mouth 2 (Two) Times a Day., Disp: 180 tablet, Rfl: 3    citalopram (CeleXA) 40 MG tablet, TAKE 1 TABLET BY MOUTH DAILY, Disp: 90 tablet, Rfl: 0    eplerenone (INSPRA) 25 MG tablet, TAKE 1/2 TABLET BY MOUTH DAILY, Disp: 15 tablet, Rfl: 3    ezetimibe (ZETIA) 10 MG tablet, Take 1 tablet by mouth Daily., Disp: 90 tablet, Rfl: 3    furosemide (LASIX) 40 MG tablet, TAKE 1 TABLET BY MOUTH DAILY, Disp: 90 tablet, Rfl: 0    losartan (COZAAR) 100 MG tablet, TAKE 1 TABLET BY MOUTH DAILY, Disp: 30 tablet, Rfl: 11    rosuvastatin (CRESTOR) 20 MG tablet, Take 1 tablet by mouth Daily., Disp: 90 tablet, Rfl: 3    Sotagliflozin (Inpefa) 200 MG tablet, Take 1 tablet by mouth Daily., Disp: 30 tablet, Rfl: 11    Medicines reviewed by Leeann Putnam, PharmD on 12/9/2024 at 10:49 AM    Drug Interactions  None    Adverse Drug  Reactions  Medication tolerability: Tolerating with no to minimal ADRs  Medication plan: Continue therapy with normal follow-up  Plan for ADR Management: N/A    Adherence, Self-Administration, and Current Therapy Problems  Adherence related to the patient's specialty therapy was discussed with the patient. The Adherence segment of this outreach has been reviewed and updated.     Adherence Questions  Linked Medication(s) Assessed: Sotagliflozin (Inpefa)  On average, how many doses/injections does the patient miss per month?: 0  What are the identified reasons for non-adherence or missed doses? : no problems identified  What is the estimated medication adherence level?: %  Based on the patient/caregiver response and refill history, does this patient require an MTP to track adherence improvements?: no    Additional Barriers to Patient Self-Administration: N/A  Methods for Supporting Patient Self-Administration: N/A    Open Medication Therapy Problems  No medication therapy recommendations to display    Goals of Therapy  Goals related to the patient's specialty therapy were discussed with the patient. The Patient Goals segment of this outreach has been reviewed and updated.   Goals Addressed Today        Specialty Pharmacy General Goal      Improve heart failure symptoms and reduce hospitalizations for heart failure    12/9/24: Patient notes symptom improvement and has not had a hospitalization for Heart Failure since initiating medication               Quality of Life Assessment   Quality of Life related to the patient's enrollment in the patient management program and services provided was discussed with the patient. The QOL segment of this outreach has been reviewed and updated.  Quality of Life Improvement Scale: 8-Moderately better    Reassessment Plan & Follow-Up  1. Medication Therapy Changes: Continue Inpefa 200mg by mouth daily  2. Related Plans, Therapy Recommendations, or Issues to Be Addressed:  None  3. Pharmacist to perform regular assessments no more than (6) months from the previous assessment.  4. Care Coordinator to set up future refill outreaches, coordinate prescription delivery, and escalate clinical questions to pharmacist.    Attestation  Therapeutic appropriateness: Appropriate   I attest the patient was actively involved in and has agreed to the above plan of care.  If the prescribed therapy is at any point deemed not appropriate based on the current or future assessments, a consultation will be initiated with the patient's specialty care provider to determine the best course of action. The revised plan of therapy will be documented along with any required assessments and/or additional patient education provided.     Leeann Putnam, PharmD, [facility]  Clinical Specialty Pharmacist, Cardiology  12/9/2024  10:50 EST

## 2024-12-18 ENCOUNTER — OFFICE VISIT (OUTPATIENT)
Dept: FAMILY MEDICINE CLINIC | Facility: CLINIC | Age: 54
End: 2024-12-18
Payer: COMMERCIAL

## 2024-12-18 VITALS
WEIGHT: 230.4 LBS | HEART RATE: 62 BPM | DIASTOLIC BLOOD PRESSURE: 83 MMHG | BODY MASS INDEX: 31.21 KG/M2 | HEIGHT: 72 IN | OXYGEN SATURATION: 96 % | SYSTOLIC BLOOD PRESSURE: 111 MMHG

## 2024-12-18 DIAGNOSIS — F41.1 GENERALIZED ANXIETY DISORDER: ICD-10-CM

## 2024-12-18 DIAGNOSIS — F32.9 REACTIVE DEPRESSION: ICD-10-CM

## 2024-12-18 DIAGNOSIS — I50.20 HFREF (HEART FAILURE WITH REDUCED EJECTION FRACTION): ICD-10-CM

## 2024-12-18 DIAGNOSIS — G47.9 SLEEP DISTURBANCE: Primary | ICD-10-CM

## 2024-12-18 DIAGNOSIS — Z23 IMMUNIZATION DUE: ICD-10-CM

## 2024-12-18 DIAGNOSIS — M54.50 CHRONIC LEFT-SIDED LOW BACK PAIN WITHOUT SCIATICA: ICD-10-CM

## 2024-12-18 DIAGNOSIS — G89.29 CHRONIC LEFT-SIDED LOW BACK PAIN WITHOUT SCIATICA: ICD-10-CM

## 2024-12-18 PROCEDURE — 90471 IMMUNIZATION ADMIN: CPT | Performed by: STUDENT IN AN ORGANIZED HEALTH CARE EDUCATION/TRAINING PROGRAM

## 2024-12-18 PROCEDURE — 90656 IIV3 VACC NO PRSV 0.5 ML IM: CPT | Performed by: STUDENT IN AN ORGANIZED HEALTH CARE EDUCATION/TRAINING PROGRAM

## 2024-12-18 PROCEDURE — 99214 OFFICE O/P EST MOD 30 MIN: CPT | Performed by: STUDENT IN AN ORGANIZED HEALTH CARE EDUCATION/TRAINING PROGRAM

## 2024-12-18 NOTE — PROGRESS NOTES
Chief Complaint   Patient presents with    Anxiety     3 month follow up       HPI:  Bj Pichardo is a 54 y.o. male who presents today for mood check, acute concerns below.    Sleep schedule has been off. Work load has been a lot less, thinks this may have caused it. Feeling run down. Tried melatonin without relief. Has gained 15 lbs in last 3 months. Hasn't been as compliant with healthy diet.     Has been on Celexa 40mg for anxiety/depression. Feels like mood is doing fairly well.     HFrEF- Following w Cardiology. On GDMT with Losartan, Eplerenone, Sotagliflozin, Lasix and Coreg. Most recent echo showed improved EF to 50%, was <10% on original when pt first presented with symptoms.     Having some occasional low back pain, mainly left side. Has hurt on/off over the years. No specific injury. No numbness, tingling, weakness, bowel/bladder incontinence.     PE:  Vitals:    12/18/24 0919   BP: 111/83   Pulse: 62   SpO2: 96%      Body mass index is 31.24 kg/m².    Gen Appearance: NAD  HEENT: Normocephalic, EOMI, no thyromegaly, trachea midline  Heart: RRR, normal S1 and S2, no murmur  Lungs: CTA b/l, no wheezing, no crackles  MSK: L spine: Normal curvature, no vertebral TTP  Neuro: No focal deficits    Current Outpatient Medications   Medication Sig Dispense Refill    aspirin 81 MG chewable tablet Chew 1 tablet Daily.      carvedilol (COREG) 12.5 MG tablet Take 1 tablet by mouth 2 (Two) Times a Day. 180 tablet 3    citalopram (CeleXA) 40 MG tablet TAKE 1 TABLET BY MOUTH DAILY 90 tablet 0    eplerenone (INSPRA) 25 MG tablet TAKE 1/2 TABLET BY MOUTH DAILY 15 tablet 3    ezetimibe (ZETIA) 10 MG tablet Take 1 tablet by mouth Daily. 90 tablet 3    furosemide (LASIX) 40 MG tablet TAKE 1 TABLET BY MOUTH DAILY 90 tablet 0    losartan (COZAAR) 100 MG tablet TAKE 1 TABLET BY MOUTH DAILY 30 tablet 11    rosuvastatin (CRESTOR) 20 MG tablet Take 1 tablet by mouth Daily. 90 tablet 3    Sotagliflozin (Inpefa) 200 MG tablet  Take 1 tablet by mouth Daily. 30 tablet 11     No current facility-administered medications for this visit.        A/P:  Diagnoses and all orders for this visit:    1. Sleep disturbance (Primary)  Reviewed patient's sleep routine, diet, exercise and daily habits. Discussed lifestyle/behavioral modifications to help with sleep regulation. Recommend starting daily exercise routine- begin with light walking and increase as tolerating. Avoid daytime naps when able. Reviewed healthy sleep hygiene practices. Can trial low dose magnesium supplement.     2. HFrEF (heart failure with reduced ejection fraction)  Following w Cardiology  Cont GDMT with Losartan, Eplerenone, Sotagliflozin, Lasix and Coreg    3. Generalized anxiety disorder  4. Reactive depression  Doing well with Celexa    5. Immunization due  -     Fluzone >6mos ([id number])    6. Chronic left-sided low back pain without sciatica  Chronic issue. No red flag symptoms. No need for imaging at this time. Recommend conservative treatment w stretching, core exercises, Tylenol PRN and massage/warm compresses. FU INB.       Return for FU in 3-4 months for HTN.     BMI is >= 30 and <35. (Class 1 Obesity). The following options were offered after discussion;: nutrition counseling/recommendations and pharmacological intervention options      Dictated Utilizing Dragon Dictation    Please note that portions of this note were completed with a voice recognition program.    Part of this note may be an electronic transcription/translation of spoken language to printed text using the Dragon Dictation System.

## 2025-01-08 ENCOUNTER — SPECIALTY PHARMACY (OUTPATIENT)
Dept: CARDIOLOGY | Facility: CLINIC | Age: 55
End: 2025-01-08
Payer: COMMERCIAL

## 2025-01-08 RX ORDER — SOTAGLIFLOZIN 200 MG/1
200 TABLET ORAL DAILY
Qty: 90 TABLET | Refills: 3 | Status: SHIPPED | OUTPATIENT
Start: 2025-01-08

## 2025-01-23 ENCOUNTER — APPOINTMENT (OUTPATIENT)
Dept: GENERAL RADIOLOGY | Facility: HOSPITAL | Age: 55
End: 2025-01-23
Payer: COMMERCIAL

## 2025-01-23 ENCOUNTER — NURSE TRIAGE (OUTPATIENT)
Dept: CALL CENTER | Facility: HOSPITAL | Age: 55
End: 2025-01-23
Payer: COMMERCIAL

## 2025-01-23 ENCOUNTER — HOSPITAL ENCOUNTER (EMERGENCY)
Facility: HOSPITAL | Age: 55
Discharge: HOME | End: 2025-01-23
Attending: EMERGENCY MEDICINE
Payer: COMMERCIAL

## 2025-01-23 VITALS
DIASTOLIC BLOOD PRESSURE: 71 MMHG | HEART RATE: 57 BPM | HEIGHT: 71 IN | RESPIRATION RATE: 18 BRPM | BODY MASS INDEX: 30.24 KG/M2 | TEMPERATURE: 97.9 F | WEIGHT: 216 LBS | OXYGEN SATURATION: 91 % | SYSTOLIC BLOOD PRESSURE: 112 MMHG

## 2025-01-23 DIAGNOSIS — S61.411A LACERATION OF RIGHT HAND WITHOUT FOREIGN BODY, INITIAL ENCOUNTER: Primary | ICD-10-CM

## 2025-01-23 DIAGNOSIS — R55 SYNCOPE, UNSPECIFIED SYNCOPE TYPE: ICD-10-CM

## 2025-01-23 DIAGNOSIS — Z86.79 HISTORY OF CHF (CONGESTIVE HEART FAILURE): ICD-10-CM

## 2025-01-23 LAB
ALBUMIN SERPL-MCNC: 4.5 G/DL (ref 3.5–5.2)
ALBUMIN/GLOB SERPL: 1.7 G/DL
ALP SERPL-CCNC: 84 U/L (ref 39–117)
ALT SERPL W P-5'-P-CCNC: 55 U/L (ref 1–41)
ANION GAP SERPL CALCULATED.3IONS-SCNC: 14 MMOL/L (ref 5–15)
AST SERPL-CCNC: 35 U/L (ref 1–40)
BASOPHILS # BLD AUTO: 0.04 10*3/MM3 (ref 0–0.2)
BASOPHILS NFR BLD AUTO: 0.6 % (ref 0–1.5)
BILIRUB SERPL-MCNC: 1 MG/DL (ref 0–1.2)
BUN SERPL-MCNC: 19 MG/DL (ref 6–20)
BUN/CREAT SERPL: 16.8 (ref 7–25)
CALCIUM SPEC-SCNC: 9.1 MG/DL (ref 8.6–10.5)
CHLORIDE SERPL-SCNC: 102 MMOL/L (ref 98–107)
CO2 SERPL-SCNC: 24 MMOL/L (ref 22–29)
CREAT SERPL-MCNC: 1.13 MG/DL (ref 0.76–1.27)
DEPRECATED RDW RBC AUTO: 39.3 FL (ref 37–54)
EGFRCR SERPLBLD CKD-EPI 2021: 78.2 ML/MIN/1.73
EOSINOPHIL # BLD AUTO: 0.19 10*3/MM3 (ref 0–0.4)
EOSINOPHIL NFR BLD AUTO: 2.9 % (ref 0.3–6.2)
ERYTHROCYTE [DISTWIDTH] IN BLOOD BY AUTOMATED COUNT: 11.6 % (ref 12.3–15.4)
GLOBULIN UR ELPH-MCNC: 2.6 GM/DL
GLUCOSE SERPL-MCNC: 102 MG/DL (ref 65–99)
HCT VFR BLD AUTO: 41.9 % (ref 37.5–51)
HGB BLD-MCNC: 15.2 G/DL (ref 13–17.7)
IMM GRANULOCYTES # BLD AUTO: 0.02 10*3/MM3 (ref 0–0.05)
IMM GRANULOCYTES NFR BLD AUTO: 0.3 % (ref 0–0.5)
LYMPHOCYTES # BLD AUTO: 1.71 10*3/MM3 (ref 0.7–3.1)
LYMPHOCYTES NFR BLD AUTO: 25.9 % (ref 19.6–45.3)
MCH RBC QN AUTO: 33.5 PG (ref 26.6–33)
MCHC RBC AUTO-ENTMCNC: 36.3 G/DL (ref 31.5–35.7)
MCV RBC AUTO: 92.3 FL (ref 79–97)
MONOCYTES # BLD AUTO: 0.42 10*3/MM3 (ref 0.1–0.9)
MONOCYTES NFR BLD AUTO: 6.4 % (ref 5–12)
NEUTROPHILS NFR BLD AUTO: 4.23 10*3/MM3 (ref 1.7–7)
NEUTROPHILS NFR BLD AUTO: 63.9 % (ref 42.7–76)
NRBC BLD AUTO-RTO: 0 /100 WBC (ref 0–0.2)
PLATELET # BLD AUTO: 218 10*3/MM3 (ref 140–450)
PMV BLD AUTO: 9.8 FL (ref 6–12)
POTASSIUM SERPL-SCNC: 3.7 MMOL/L (ref 3.5–5.2)
PROT SERPL-MCNC: 7.1 G/DL (ref 6–8.5)
QT INTERVAL: 444 MS
QTC INTERVAL: 450 MS
RBC # BLD AUTO: 4.54 10*6/MM3 (ref 4.14–5.8)
SODIUM SERPL-SCNC: 140 MMOL/L (ref 136–145)
TROPONIN T SERPL HS-MCNC: 11 NG/L
WBC NRBC COR # BLD AUTO: 6.61 10*3/MM3 (ref 3.4–10.8)

## 2025-01-23 PROCEDURE — 90471 IMMUNIZATION ADMIN: CPT | Performed by: EMERGENCY MEDICINE

## 2025-01-23 PROCEDURE — 90715 TDAP VACCINE 7 YRS/> IM: CPT | Performed by: EMERGENCY MEDICINE

## 2025-01-23 PROCEDURE — 93005 ELECTROCARDIOGRAM TRACING: CPT | Performed by: EMERGENCY MEDICINE

## 2025-01-23 PROCEDURE — 99284 EMERGENCY DEPT VISIT MOD MDM: CPT

## 2025-01-23 PROCEDURE — 73130 X-RAY EXAM OF HAND: CPT

## 2025-01-23 PROCEDURE — 85025 COMPLETE CBC W/AUTO DIFF WBC: CPT | Performed by: EMERGENCY MEDICINE

## 2025-01-23 PROCEDURE — 84484 ASSAY OF TROPONIN QUANT: CPT | Performed by: EMERGENCY MEDICINE

## 2025-01-23 PROCEDURE — 80053 COMPREHEN METABOLIC PANEL: CPT | Performed by: EMERGENCY MEDICINE

## 2025-01-23 PROCEDURE — 25010000002 LIDOCAINE 1% - EPINEPHRINE 1:100000 1 %-1:100000 SOLUTION: Performed by: EMERGENCY MEDICINE

## 2025-01-23 PROCEDURE — 25010000002 TETANUS-DIPHTH-ACELL PERTUSSIS 5-2.5-18.5 LF-MCG/0.5 SUSPENSION PREFILLED SYRINGE: Performed by: EMERGENCY MEDICINE

## 2025-01-23 PROCEDURE — 36415 COLL VENOUS BLD VENIPUNCTURE: CPT

## 2025-01-23 RX ORDER — MORPHINE SULFATE 4 MG/ML
4 INJECTION, SOLUTION INTRAMUSCULAR; INTRAVENOUS ONCE
Status: DISCONTINUED | OUTPATIENT
Start: 2025-01-23 | End: 2025-01-23 | Stop reason: HOSPADM

## 2025-01-23 RX ORDER — LIDOCAINE HYDROCHLORIDE AND EPINEPHRINE 10; 10 MG/ML; UG/ML
10 INJECTION, SOLUTION INFILTRATION; PERINEURAL ONCE
Status: COMPLETED | OUTPATIENT
Start: 2025-01-23 | End: 2025-01-23

## 2025-01-23 RX ORDER — ONDANSETRON 2 MG/ML
4 INJECTION INTRAMUSCULAR; INTRAVENOUS ONCE
Status: DISCONTINUED | OUTPATIENT
Start: 2025-01-23 | End: 2025-01-23 | Stop reason: HOSPADM

## 2025-01-23 RX ADMIN — LIDOCAINE HYDROCHLORIDE AND EPINEPHRINE 10 ML: 10; 10 INJECTION, SOLUTION INFILTRATION; PERINEURAL at 12:55

## 2025-01-23 RX ADMIN — TETANUS TOXOID, REDUCED DIPHTHERIA TOXOID AND ACELLULAR PERTUSSIS VACCINE, ADSORBED 0.5 ML: 5; 2.5; 8; 8; 2.5 SUSPENSION INTRAMUSCULAR at 11:33

## 2025-01-23 NOTE — DISCHARGE INSTRUCTIONS
Please return to the emergency department immediately if your sutures come out and your wound opens up.  Please also return if you begin to experience any swelling, redness, drainage from the wound or if you begin to experience any fever or chills.  Please follow-up with the listed hand surgeon for reexamination of your wound.

## 2025-01-23 NOTE — ED PROVIDER NOTES
[place]    EMERGENCY DEPARTMENT ENCOUNTER      Pt Name: Bj Pichardo  MRN: [id number]  YOB: 1970  Date of evaluation: 1/23/2025  Provider: Artem George MD    CHIEF COMPLAINT       Chief Complaint   Patient presents with    Laceration         HISTORY OF PRESENT ILLNESS   Bj Pichardo is a 54 y.o. male who presents to the emergency department with laceration and syncopal episode that occurred just prior to arrival.  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  He was changing a blade and one of his stools when it slipped and sliced the palmar aspect of his right hand.  He had a significant amount of bleeding initially.  Per report from EMS, school nurse was immediately attended with the patient.  When he saw the blood, he became lightheaded and passed out for several seconds.  No report of any convulsive activity.  He had 1 episode of vomiting upon awakening.  He denies any associated chest pain, difficulty breathing, or palpitations.  He is currently complaining about pain in his hand but has no current chest pain or difficulty breathing.  He denies any tingling or numbness to his fingers distally.  He takes aspirin but no anticoagulant or other antiplatelet medications      Nursing notes were reviewed.    REVIEW OF SYSTEMS     ROS:  A chief complaint appropriate review of systems was completed and is negative except as noted in the HPI.      PAST MEDICAL HISTORY     Past Medical History:   Diagnosis Date    Abnormal ECG 10-27-23    CHF (congestive heart failure) 10-7-03    Depression 2017?    Hypertension 10-20-23    Kidney stone          SURGICAL HISTORY     History reviewed. No pertinent surgical history.      CURRENT MEDICATIONS       Current Facility-Administered Medications:     lidocaine 1% - EPINEPHrine 1:[id number] (XYLOCAINE W/EPI) 1 %-1:595703 injection 10 mL, 10 mL, Injection, Once, Artem George MD    Morphine sulfate (PF) injection  4 mg, 4 mg, Intravenous, Once, Artem George MD    ondansetron (ZOFRAN) injection 4 mg, 4 mg, Intravenous, Once, Artem George MD    Current Outpatient Medications:     aspirin 81 MG chewable tablet, Chew 1 tablet Daily., Disp: , Rfl:     carvedilol (COREG) 12.5 MG tablet, Take 1 tablet by mouth 2 (Two) Times a Day., Disp: 180 tablet, Rfl: 3    citalopram (CeleXA) 40 MG tablet, TAKE 1 TABLET BY MOUTH DAILY, Disp: 90 tablet, Rfl: 0    eplerenone (INSPRA) 25 MG tablet, TAKE 1/2 TABLET BY MOUTH DAILY, Disp: 15 tablet, Rfl: 3    ezetimibe (ZETIA) 10 MG tablet, Take 1 tablet by mouth Daily., Disp: 90 tablet, Rfl: 3    furosemide (LASIX) 40 MG tablet, TAKE 1 TABLET BY MOUTH DAILY, Disp: 90 tablet, Rfl: 0    losartan (COZAAR) 100 MG tablet, TAKE 1 TABLET BY MOUTH DAILY, Disp: 30 tablet, Rfl: 11    rosuvastatin (CRESTOR) 20 MG tablet, Take 1 tablet by mouth Daily., Disp: 90 tablet, Rfl: 3    Sotagliflozin (Inpefa) 200 MG tablet, Take 1 tablet by mouth Daily., Disp: 90 tablet, Rfl: 3    ALLERGIES     Patient has no known allergies.    FAMILY HISTORY       Family History   Problem Relation Age of Onset    Arthritis Mother     Arthritis Father     Heart attack Father     Ankylosing spondylitis Brother     Arthritis Brother           SOCIAL HISTORY       Social History     Socioeconomic History    Marital status: Single   Tobacco Use    Smoking status: Never     Passive exposure: Never    Smokeless tobacco: Never   Vaping Use    Vaping status: Never Used   Substance and Sexual Activity    Alcohol use: Not Currently     Comment: stopped drinking    Drug use: Not Currently     Types: Marijuana     Comment: quit    Sexual activity: Not Currently     Partners: Female         PHYSICAL EXAM    (up to 7 for level 4, 8 or more for level 5)     Vitals:    01/23/25 1225 01/23/25 1230 01/23/25 1235 01/23/25 1236   BP: 106/76 117/74 112/71    BP Location:       Pulse: 60 56  57   Resp:       Temp:       TempSrc:       SpO2: 94%  91%     Weight:       Height:           General: Awake, alert, no acute distress.  HEENT: Conjunctivae normal.  Neck: Trachea midline.  Cardiac: Heart regular rate, rhythm, no murmurs, rubs, or gallops  Lungs: Lungs are clear to auscultation, there is no wheezing, rhonchi, or rales. There is no use of accessory muscles.  Chest wall: There is no tenderness to palpation over the chest wall or over ribs  Abdomen: Abdomen is soft, nontender, nondistended. There are no firm or pulsatile masses, no rebound rigidity or guarding.   Musculoskeletal: No deformity.  Neuro: Alert and oriented x 4.  Dermatology: Skin is warm and dry  Psych: Mentation is grossly normal, cognition is grossly normal. Affect is appropriate.        DIAGNOSTIC RESULTS     EKG: All EKGs are interpreted by the Emergency Department Physician who either signs or Co-signs this chart in the absence of a cardiologist.    ECG 12 Lead Syncope   Preliminary Result   Test Reason : HYPOTN   Blood Pressure :   */*   mmHG   Vent. Rate :  62 BPM     Atrial Rate :  79 BPM      P-R Int : 146 ms          QRS Dur : 144 ms       QT Int : 444 ms       P-R-T Axes :  27 -49 175 degrees     QTcB Int : 450 ms      Sinus rhythm with premature atrial complexes   Left axis deviation   Left ventricular hypertrophy with QRS widening and repolarization   abnormality   Abnormal ECG   When compared with ECG of 12-Nov-2023 15:42,   Significant changes have occurred      Referred By: EDMD           Confirmed By:             RADIOLOGY:   [x] Radiologist's Report Reviewed:  XR Hand 3+ View Right   Final Result   Impression:   Degenerative changes of the right hand. No acute osseous abnormality.         Electronically Signed: Wen Thompson MD     1/23/2025 11:39 AM EST     Workstation ID: [id number]          I ordered and independently reviewed the above noted radiographic studies.        LABS:    I have reviewed and interpreted all of the currently available lab results from this visit  (if applicable):  Results for orders placed or performed during the hospital encounter of 01/23/25   Comprehensive Metabolic Panel    Collection Time: 01/23/25 11:37 AM    Specimen: Blood   Result Value Ref Range    Glucose 102 (H) 65 - 99 mg/dL    BUN 19 6 - 20 mg/dL    Creatinine 1.13 0.76 - 1.27 mg/dL    Sodium 140 136 - 145 mmol/L    Potassium 3.7 3.5 - 5.2 mmol/L    Chloride 102 98 - 107 mmol/L    CO2 24.0 22.0 - 29.0 mmol/L    Calcium 9.1 8.6 - 10.5 mg/dL    Total Protein 7.1 6.0 - 8.5 g/dL    Albumin 4.5 3.5 - 5.2 g/dL    ALT (SGPT) 55 (H) 1 - 41 U/L    AST (SGOT) 35 1 - 40 U/L    Alkaline Phosphatase 84 39 - 117 U/L    Total Bilirubin 1.0 0.0 - 1.2 mg/dL    Globulin 2.6 gm/dL    A/G Ratio 1.7 g/dL    BUN/Creatinine Ratio 16.8 7.0 - 25.0    Anion Gap 14.0 5.0 - 15.0 mmol/L    eGFR 78.2 >60.0 mL/min/1.73   High Sensitivity Troponin T    Collection Time: 01/23/25 11:37 AM    Specimen: Blood   Result Value Ref Range    HS Troponin T 11 <22 ng/L   CBC Auto Differential    Collection Time: 01/23/25 11:37 AM    Specimen: Blood   Result Value Ref Range    WBC 6.61 3.40 - 10.80 10*3/mm3    RBC 4.54 4.14 - 5.80 10*6/mm3    Hemoglobin 15.2 13.0 - 17.7 g/dL    Hematocrit 41.9 37.5 - 51.0 %    MCV 92.3 79.0 - 97.0 fL    MCH 33.5 (H) 26.6 - 33.0 pg    MCHC 36.3 (H) 31.5 - 35.7 g/dL    RDW 11.6 (L) 12.3 - 15.4 %    RDW-SD 39.3 37.0 - 54.0 fl    MPV 9.8 6.0 - 12.0 fL    Platelets 218 140 - 450 10*3/mm3    Neutrophil % 63.9 42.7 - 76.0 %    Lymphocyte % 25.9 19.6 - 45.3 %    Monocyte % 6.4 5.0 - 12.0 %    Eosinophil % 2.9 0.3 - 6.2 %    Basophil % 0.6 0.0 - 1.5 %    Immature Grans % 0.3 0.0 - 0.5 %    Neutrophils, Absolute 4.23 1.70 - 7.00 10*3/mm3    Lymphocytes, Absolute 1.71 0.70 - 3.10 10*3/mm3    Monocytes, Absolute 0.42 0.10 - 0.90 10*3/mm3    Eosinophils, Absolute 0.19 0.00 - 0.40 10*3/mm3    Basophils, Absolute 0.04 0.00 - 0.20 10*3/mm3    Immature Grans, Absolute 0.02 0.00 - 0.05 10*3/mm3    nRBC 0.0 0.0 - 0.2 /100  WBC   ECG 12 Lead Syncope    Collection Time: 01/23/25 11:40 AM   Result Value Ref Range    QT Interval 444 ms    QTC Interval 450 ms        If labs were ordered, I independently reviewed the results and considered them in treating the patient.      EMERGENCY DEPARTMENT COURSE and DIFFERENTIAL DIAGNOSIS/MDM:   Vitals:  AS OF 12:49 EST    BP - 112/71  HR - 57  TEMP - 97.9 °F (36.6 °C) (Oral)  O2 SATS - 91%        Discussion below represents my analysis of pertinent findings related to patient's condition, differential diagnosis, treatment plan and final disposition.      Differential diagnosis:  The differential diagnosis associated with the patient's presentation includes: Hand laceration, tendon laceration, hand fracture, vascular injury, vasovagal syncope, dysrhythmia      Independent interpretations (ECG/rhythm strip/X-ray/US/CT scan): I independently interpreted the patient's right hand x-ray and cardiac monitor.  Patient in sinus rhythm and there is no evidence of underlying fracture.      Additional sources:  Discussed/obtained information from independent historians:   [] Spouse:   [] Parent:   [] Friend:   [x] EMS: Report was taken from EMS.  Patient vital signs stable during transport.  Patient had 1 syncopal episode and 1 episode of vomiting that occurred when he looked at the laceration.   [] Other:  External (non-ED) record review:   [] Inpatient record:   [] Office record:   [] Outpatient record:   [] Prior Outpatient labs:   [] Prior Outpatient radiology:   [] Primary Care record:   [] Outside ED record:   [x] Other: Patient has chart reported history of congestive heart failure.  I reviewed his echocardiogram from 2023.  Left ventricular ejection fraction less than 20%, findings consistent with dilated cardiomyopathy.      Patient's care impacted by:   [] Diabetes   [x] Hypertension   [] Coronary Artery Disease   [] Cancer   [x] Other: Congestive heart failure with reduced ejection fraction    Care  significantly affected by Social Determinants of Health (housing and economic circumstances, unemployment)    [] Yes     [x] No   If yes, Patient's care significantly limited by  Social Determinants of Health including:    [] Inadequate housing    [] Low income    [] Alcoholism and drug addiction in family    [] Problems related to primary support group    [] Unemployment    [] Problems related to employment    [] Other Social Determinants of Health:       ED Course:    ED Course as of 01/23/25 1249   Thu Jan 23, 2025   1243 Laceration repaired by myself.  X-ray obtained before hand reveals no evidence of foreign body or underlying bony injury.  This was a clean laceration with intact blade that the patient brought with him.  I anesthetized the wound and was able to visualize it to the base.  There is no evidence of tendon involvement.  Patient maintains full flexion of all fingers and is able to hold flexion against resistance.  He has no distal sensory loss and distal capillary refill is brisk.  Patient had minimal amount of oozing from the wound but no pulsatile bleeding or hematoma.  There is no visual or clinical evidence of tendon involvement.  No foreign body was identified.  The wound was thoroughly cleaned with a liter of sterile saline under pressure and closed.  Patient tetanus was updated.  In regards to his syncopal episode, history is strongly suggestive of vasovagal episode.  He had no concerning chest pain or discomfort, dyspnea, or palpitations.  ECG appears similar to previous and labs are stable.  Given the size of the laceration as well as the fact that the patient works with his hands, will refer to hand surgery for follow-up exam. [NS]      ED Course User Index  [NS] Artem Goerge MD           I had a discussion with the patient/family regarding diagnosis, diagnostic results, treatment plan, and medications.  The patient/family indicated understanding of these instructions.  I spent  adequate time at the bedside preceding discharge necessary to personally discuss the aftercare instructions, giving patient education, providing explanations of the results of our evaluations/findings, and my decision making to assure that the patient/family understand the plan of care.  Time was allotted to answer questions at that time and throughout the ED course.  Emphasis was placed on timely follow-up after discharge.  I also discussed the potential for the development of an acute emergent condition requiring further evaluation, admission, or even surgical intervention. I discussed that we found nothing during the visit today indicating the need for further workup, admission, or the presence of an unstable medical condition.  I encouraged the patient to return to the emergency department immediately for ANY concerns, worsening, new complaints, or if symptoms persist and unable to seek follow-up in a timely fashion.  The patient/family expressed understanding and agreement with this plan.  The patient will follow-up with their PCP in 1-2 days for reevaluation.           PROCEDURES:  Laceration Repair  Indication: Right hand laceration  Consent: Verbal from  Technique: A 7 cm laceration is present on the patient's right medial hand and obliquely oriented over the hyperthenar eminence with the following features: Involvement of fat layer, mild oozing, no foreign body, no tendon involvement, no evidence of neurovascular compromise. There is no evidence of significant vascular, neurologic, or soft tissue compromise. Full functional status is maintained. The laceration was copious irrigated with 1 L of sterile saline under pressure. The wound was carefully inspected and probed to the base and found to have no deep structure involvement or foreign body. The wound was anesthetized with approximately 15 mL of lidocaine 1% with epinephrine. The wound was repaired using a total of 6 sutures in the following technique(s):  The medialmost aspect of the wound required some deep closure with two 5-0 Vicryl Rapide sutures placed in simple interrupted fashion.  The overlying skin was then closed from lateral to medial with the first 2 sutures being running sutures using 5-0 Ethilon followed by 2 simple interrupted sutures using 5-0 Ethilon to close the medialmost aspect of the wound.  Complications: None        FINAL IMPRESSION      1. Laceration of right hand without foreign body, initial encounter    2. Syncope, unspecified syncope type    3. History of CHF (congestive heart failure)          DISPOSITION/PLAN     ED Disposition       ED Disposition   Discharge    Condition   Stable    Comment   --                 Comment: Please note this report has been produced using speech recognition software.      Artem George MD  Attending Emergency Physician             Artem George MD  01/23/25 2015

## 2025-01-23 NOTE — TELEPHONE ENCOUNTER
Secure chat sent to Dr George in ER he is on duty, asking if he would like to order anything.  I will relay to [phone].

## 2025-01-23 NOTE — TELEPHONE ENCOUNTER
"Reason for Disposition   Prescription request for new medicine (not a refill)    Additional Information   Negative: New-onset or worsening symptoms, see that guideline (e.g., diarrhea, runny nose, sore throat)   Negative: Medicine question not related to refill or renewal   Negative: Caller (e.g., patient or pharmacist) requesting information about a new medicine   Negative: Caller requesting information unrelated to medicine   Negative: [1] Prescription refill request for ESSENTIAL medicine (i.e., likelihood of harm to patient if not taken) AND [2] triager unable to refill per department policy   Negative: [1] Prescription not at pharmacy AND [2] was prescribed by PCP recently  (Exception: Triager has access to EMR and prescription is recorded there. Go to Home Care and confirm for pharmacy.)   Negative: [1] Pharmacy calling with prescription questions AND [2] triager unable to answer question   Negative: Caller requesting a CONTROLLED substance prescription refill (e.g., narcotics, ADHD medicines)   Negative: [1] Prescription refill request for NON-ESSENTIAL medicine (i.e., no harm to patient if med not taken) AND [2] triager unable to refill per department policy   Negative: [1] Caller has NON-URGENT medicine question about med that PCP prescribed AND [2] triager unable to answer question   Negative: [1] Prescription prescribed recently is not at pharmacy AND [2] triager has access to patient's EMR AND [3] prescription is recorded in the EMR    Answer Assessment - Initial Assessment Questions  1. DRUG NAME: \"What medicine do you need to have refilled?\"      Request for a new rx for antibiotic to laceration to hand  2. REFILLS REMAINING: \"How many refills are remaining?\" (Note: The label on the medicine or pill bottle will show how many refills are remaining. If there are no refills remaining, then a renewal may be needed.)      na  3. EXPIRATION DATE: \"What is the expiration date?\" (Note: The label states when " "the prescription will , and thus can no longer be refilled.)      na  4. PRESCRIBING HCP: \"Who prescribed it?\" Reason: If prescribed by specialist, call should be referred to that group.      Dr Artem George, ER physician.   5. SYMPTOMS: \"Do you have any symptoms?\"      Laceration with sutures to hand  6. PREGNANCY: \"Is there any chance that you are pregnant?\" \"When was your last menstrual period?\"      na    Protocols used: Medication Refill and Renewal Call-ADULT-    "

## 2025-01-24 NOTE — TELEPHONE ENCOUNTER
Dr George responded to message that prophylactic antibiotic is not indicated with this type of injury.  Called Kaitlynn back to let her know but NA, Left my number to call to give her the advise. VM is not identified as being her so left no care advise only to call me.

## 2025-01-27 ENCOUNTER — OFFICE VISIT (OUTPATIENT)
Age: 55
End: 2025-01-27
Payer: COMMERCIAL

## 2025-01-27 ENCOUNTER — OFFICE VISIT (OUTPATIENT)
Dept: CARDIOLOGY | Facility: CLINIC | Age: 55
End: 2025-01-27
Payer: COMMERCIAL

## 2025-01-27 ENCOUNTER — LAB (OUTPATIENT)
Facility: HOSPITAL | Age: 55
End: 2025-01-27
Payer: COMMERCIAL

## 2025-01-27 VITALS
DIASTOLIC BLOOD PRESSURE: 82 MMHG | SYSTOLIC BLOOD PRESSURE: 124 MMHG | OXYGEN SATURATION: 98 % | HEIGHT: 71 IN | WEIGHT: 226 LBS | BODY MASS INDEX: 31.64 KG/M2 | HEART RATE: 77 BPM

## 2025-01-27 VITALS
BODY MASS INDEX: 31.64 KG/M2 | WEIGHT: 226 LBS | SYSTOLIC BLOOD PRESSURE: 127 MMHG | DIASTOLIC BLOOD PRESSURE: 70 MMHG | HEIGHT: 71 IN

## 2025-01-27 DIAGNOSIS — S61.411A SUPERFICIAL LACERATION OF HAND, RIGHT, INITIAL ENCOUNTER: Primary | ICD-10-CM

## 2025-01-27 DIAGNOSIS — I51.9 SYSTOLIC DYSFUNCTION: ICD-10-CM

## 2025-01-27 DIAGNOSIS — I49.3 PVC (PREMATURE VENTRICULAR CONTRACTION): Primary | ICD-10-CM

## 2025-01-27 DIAGNOSIS — E78.00 PURE HYPERCHOLESTEROLEMIA: ICD-10-CM

## 2025-01-27 DIAGNOSIS — I49.3 PVC (PREMATURE VENTRICULAR CONTRACTION): ICD-10-CM

## 2025-01-27 PROCEDURE — 86141 C-REACTIVE PROTEIN HS: CPT

## 2025-01-27 PROCEDURE — 36415 COLL VENOUS BLD VENIPUNCTURE: CPT

## 2025-01-27 PROCEDURE — 80061 LIPID PANEL: CPT

## 2025-01-27 PROCEDURE — 83880 ASSAY OF NATRIURETIC PEPTIDE: CPT

## 2025-01-27 PROCEDURE — 99214 OFFICE O/P EST MOD 30 MIN: CPT | Performed by: INTERNAL MEDICINE

## 2025-01-27 PROCEDURE — 80053 COMPREHEN METABOLIC PANEL: CPT

## 2025-01-27 RX ORDER — CEPHALEXIN 500 MG/1
500 CAPSULE ORAL 2 TIMES DAILY
COMMUNITY
Start: 2025-01-24

## 2025-01-27 NOTE — PROGRESS NOTES
[facility]     Office Visit       Date: 01/27/2025   Patient Name: Bj Pichardo  MRN: [id number]  YOB: 1970    Referring Physician: Olesya Kern DO     Chief Complaint:   Chief Complaint   Patient presents with    Right Hand - Pain       History of Present Illness:   Bj Pichardo is a 54 y.o. male right-hand-dominant presents with right hand laceration of 4 days duration.  Patient reports he sustained a laceration to the volar ulnar aspect of his right hand with a window scraping blade.  Reports full-thickness laceration.  Denies numbness or tingling.  Denies loss of range of motion.  Patient reports minimal pain.  He underwent repair in the emergency department.  He is otherwise healthy.  He works doing residential and commercial window cleaning      Subjective   Review of Systems:   Review of Systems   Constitutional:  Negative for chills, fever, unexpected weight gain and unexpected weight loss.   HENT:  Negative for congestion, postnasal drip and rhinorrhea.    Eyes:  Negative for blurred vision.   Respiratory:  Negative for shortness of breath.    Cardiovascular:  Negative for leg swelling.   Gastrointestinal:  Negative for abdominal pain, nausea and vomiting.   Genitourinary:  Negative for difficulty urinating.   Musculoskeletal:  Positive for arthralgias. Negative for gait problem, joint swelling and myalgias.   Skin:  Negative for skin lesions and wound.   Neurological:  Negative for dizziness, weakness, light-headedness and numbness.   Hematological:  Does not bruise/bleed easily.   Psychiatric/Behavioral:  Negative for depressed mood.         Pertinent review of systems per HPI.     I reviewed the patient's chief complaint, history of present illness, review of systems, past medical history, surgical history, family history, social history, medications and allergy list in the EMR on  "01/27/2025 and agree with the findings above.    Objective    Vital Signs:   Vitals:    01/27/25 1051   BP: 127/70   Weight: 103 kg (226 lb)   Height: 180.3 cm (70.98\")     BMI: Body mass index is 31.53 kg/m².    General Appearance: No acute distress. Alert and oriented.     Chest:  Non-labored breathing on room air. Regular rate and rhythm.    Upper Extremity Exam:    5 cm curvilinear laceration over the volar ulnar aspect of the right hand s/p repair.  Incision is clear dry intact and healing appropriately.  Sensation intact to light touch distal to the laceration all 5 fingers.  FDS FDP intact to all 5 fingers.  Minimal pain at the laceration site.    Fingers are warm, well-perfused with appropriate capillary refill.  Palpable radial pulse.    Sensation intact to light touch in median, radial and ulnar nerve distributions.    Motor- Fires FPL, ulnar intrinsics, EPL/EDC w/ full active and passive range of motion. Strength intact.    Non-tender except for in the areas highlighted    Imaging/Studies:   Imaging Results (Last 24 Hours)       ** No results found for the last 24 hours. **            X-ray of the right hand from 1/23/2025 is independently reviewed and interpreted myself demonstrates no evidence of acute bony abnormality.    Procedures:  Procedures    Quality Measures:   ACP:   ACP discussion was deferred.    Tobacco:   Bj Pichardo  reports that he has never smoked. He has never been exposed to tobacco smoke. He has never used smokeless tobacco.      Assessment / Plan    Assessment/Plan:     There are no diagnoses linked to this encounter.     Bj Pichardois a 54 y.o. male who presents with:      ICD-10-CM ICD-9-CM   1. Superficial laceration of hand, right, initial encounter  S61.411A 914.8         Patient presents with a right hand laceration s/p repair in the emergency department on 1/23/2025.  Patient was fortunate enough not to injure any of the deep structures of his right hand.  " Ulnar nerve, artery and tendons are intact on exam.  Discussed with this with the patient.  Recommend daily dressing changes with dry sterile gauze.  Recommend home exercise program.  Recommend follow-up in 2 weeks for suture removal.    Follow Up:   Return in about 2 weeks (around 2/10/2025).        Salinas Cooley MD  [facility] Hand and Upper Extremity Surgeon

## 2025-01-27 NOTE — PROGRESS NOTES
"Chief Complaint  Systolic dysfunction (6 month follow up)      Subjective   History of Present Illness    Problem List  - was compensated severe systolic dysfunction, NICM LVEF 20%.  Now LVEF 50% on recent study.    -prior heavy drinking  -family hx of CAD, HLD, mild nocad on CT (CAC 5)  -PVC, 9%   -EKG, LVH    Mr. Pichardo is a 52 year old man with above hx.  Was a little short of breath.  CXR showed cardiomegaly and echo showed severe systolic dysfunction. Placed on diuretic and symptoms resolved.  Was drinking heavily but quit a few weeks ago.  ROS negative except for the above.        Update 11/17/23  Ran out of lasix and went to ER.  Got some more lasix and doing well.  Feels great.  9% pvc burden       Update 12/1/23  Feeling much better, back to walking dogs.      Update 1/5/24  No complaints    Update 2/5/24  No complaints    Update 3/18/24  No complaints    Update 6/17/24  No complaints.      Update 7/18/24  Feeling much better. EF has essentially normalized.    Update 1/27/25  No complaints.      Objective   Vital Signs:  Vitals:    01/27/25 0937   BP: 124/82   Pulse: 77   SpO2: 98%     Estimated body mass index is 31.52 kg/m² as calculated from the following:    Height as of this encounter: 180.3 cm (71\").    Weight as of this encounter: 103 kg (226 lb).       Physical Exam  HENT:      Head: Normocephalic.   Eyes:      Extraocular Movements: Extraocular movements intact.   Cardiovascular:      Rate and Rhythm: Normal rate and regular rhythm.      Heart sounds: No murmur heard.     No gallop.   Pulmonary:      Breath sounds: Normal breath sounds.   Abdominal:      Palpations: Abdomen is soft.   Musculoskeletal:      Right lower leg: No edema.      Left lower leg: No edema.   Skin:     General: Skin is warm and dry.   Neurological:      General: No focal deficit present.      Mental Status: He is alert.   Psychiatric:         Mood and Affect: Mood normal.     Clinic discussed advanced directive      "     Assessment   - was compensated severe systolic dysfunction, NICM LVEF 20%.  Now LVEF 50% on recent study.    -prior heavy drinking  -family hx of CAD, HLD, mild nocad on CT (CAC 5)  -PVC, 9%   -EKG, LVH    Plan   -on losartan , coreg, inspira, sotagliflozin  and aspirin (aspirin probably not necessary may stop in future).   -cont. Lipid lowering therapy  -blood work, 6 month follow up  -will need to repeat echo at some point.  Would ideally like to use strain but his insurance coverage has been an issue.  May possibly look into getting echo done at [facility] for reduced swift.          Vega Yoder MD

## 2025-01-28 LAB
ALBUMIN SERPL-MCNC: 4.3 G/DL (ref 3.5–5.2)
ALBUMIN/GLOB SERPL: 1.4 G/DL
ALP SERPL-CCNC: 82 U/L (ref 39–117)
ALT SERPL W P-5'-P-CCNC: 48 U/L (ref 1–41)
ANION GAP SERPL CALCULATED.3IONS-SCNC: 11 MMOL/L (ref 5–15)
AST SERPL-CCNC: 31 U/L (ref 1–40)
BILIRUB SERPL-MCNC: 1.2 MG/DL (ref 0–1.2)
BUN SERPL-MCNC: 13 MG/DL (ref 6–20)
BUN/CREAT SERPL: 11.4 (ref 7–25)
CALCIUM SPEC-SCNC: 9.1 MG/DL (ref 8.6–10.5)
CHLORIDE SERPL-SCNC: 104 MMOL/L (ref 98–107)
CHOLEST SERPL-MCNC: 117 MG/DL (ref 0–200)
CO2 SERPL-SCNC: 26 MMOL/L (ref 22–29)
CREAT SERPL-MCNC: 1.14 MG/DL (ref 0.76–1.27)
CRP SERPL-MCNC: 0.08 MG/DL (ref 0.01–0.5)
EGFRCR SERPLBLD CKD-EPI 2021: 77.4 ML/MIN/1.73
GLOBULIN UR ELPH-MCNC: 3.1 GM/DL
GLUCOSE SERPL-MCNC: 107 MG/DL (ref 65–99)
HDLC SERPL-MCNC: 33 MG/DL (ref 40–60)
LDLC SERPL CALC-MCNC: 51 MG/DL (ref 0–100)
LDLC/HDLC SERPL: 1.34 {RATIO}
NT-PROBNP SERPL-MCNC: 37.9 PG/ML (ref 0–900)
POTASSIUM SERPL-SCNC: 3.8 MMOL/L (ref 3.5–5.2)
PROT SERPL-MCNC: 7.4 G/DL (ref 6–8.5)
SODIUM SERPL-SCNC: 141 MMOL/L (ref 136–145)
TRIGL SERPL-MCNC: 199 MG/DL (ref 0–150)
VLDLC SERPL-MCNC: 33 MG/DL (ref 5–40)

## 2025-01-28 RX ORDER — CARVEDILOL 12.5 MG/1
12.5 TABLET ORAL 2 TIMES DAILY
Qty: 180 TABLET | Refills: 3 | Status: SHIPPED | OUTPATIENT
Start: 2025-01-28

## 2025-01-28 NOTE — TELEPHONE ENCOUNTER
Miriam Duong is here for GYN exam    Concerns :was still having issues with her menses, but has tried Trasmic Acid and this has helped stop herbleeding.    Last Well Women Exam 12/17/2024  Last Pap: 11/14/2022 - WNL with Dr. Chowdhury  History of abnormal Pap Smears: yes - HX of LEEP(conization in 2007), Last [place] ws in 2015  Gardasil series:Completed  Last TD/Tdap: 2024  Last Flu: 2024  Refills: no  Contraception used: nothing    Chaperone needed:no    Health Maintenance Due   Topic Date Due    Pneumococcal Vaccine 0-64 (2 of 2 - PCV) 10/31/2004    Influenza Vaccine (1) 09/01/2024    COVID-19 Vaccine (3 - 2024-25 season) 09/01/2024    Diabetes A1C  12/19/2024    Diabetes Eye Exam  01/01/2025    Diabetes Foot Exam  02/21/2025       Patient is up to date, no discussion needed.        Patient would like communication of their results via:    [facility]    Patients Medical, Surgical, Family, Substance/Sexual and Obstetrics history were reviewed and/or updated.  Tobacco status verified. Latex status verified.            Per Dr. Yoder HONORIO 1/27/25 continue coreg.

## 2025-01-29 ENCOUNTER — TELEPHONE (OUTPATIENT)
Dept: CARDIOLOGY | Facility: CLINIC | Age: 55
End: 2025-01-29
Payer: COMMERCIAL

## 2025-01-29 NOTE — TELEPHONE ENCOUNTER
----- Message from Vega Yoder sent at 1/28/2025 10:25 AM EST -----  Blood work look great except mildly elevated triglycerides.

## 2025-01-29 NOTE — TELEPHONE ENCOUNTER
Pt returned phone call. Relayed Dr. Yoder's comments. No changes to plan of care at this time. Pt verbalized understanding and agreeable to plan of care.

## 2025-02-11 LAB
QT INTERVAL: 444 MS
QTC INTERVAL: 450 MS

## 2025-02-17 ENCOUNTER — OFFICE VISIT (OUTPATIENT)
Age: 55
End: 2025-02-17
Payer: COMMERCIAL

## 2025-02-17 VITALS
DIASTOLIC BLOOD PRESSURE: 98 MMHG | BODY MASS INDEX: 31.79 KG/M2 | HEIGHT: 71 IN | WEIGHT: 227.07 LBS | SYSTOLIC BLOOD PRESSURE: 138 MMHG

## 2025-02-17 DIAGNOSIS — S61.411D: Primary | ICD-10-CM

## 2025-02-17 NOTE — PROGRESS NOTES
[facility] [place] [facility] Office Follow Up       Office Follow Up Visit       Patient Name: Bj Pichardo    Chief Complaint:   Chief Complaint   Patient presents with    Follow-up     2 week recheck - Superficial laceration of hand, right,       Referring Physician: Olesya Kern DO    History of Present Illness:   Bj Pichardo returns to clinic today for f/up right hand laceration.  He reports itching but no pain.  No new symptoms.      Subjective     Review of Systems   Constitutional:  Negative for chills, fever, unexpected weight gain and unexpected weight loss.   HENT:  Negative for congestion, postnasal drip and rhinorrhea.    Eyes:  Negative for blurred vision.   Respiratory:  Negative for shortness of breath.    Cardiovascular:  Negative for leg swelling.   Gastrointestinal:  Negative for abdominal pain, nausea and vomiting.   Genitourinary:  Negative for difficulty urinating.   Musculoskeletal:  Positive for arthralgias. Negative for gait problem, joint swelling and myalgias.   Skin:  Negative for skin lesions and wound.   Neurological:  Negative for dizziness, weakness, light-headedness and numbness.   Hematological:  Does not bruise/bleed easily.   Psychiatric/Behavioral:  Negative for depressed mood.    All other systems reviewed and are negative.       I have reviewed and updated the following portions of the patient's history and review of systems: allergies, current medications, past family history, past medical history, past social history, past surgical history and problem list.    Medications:   Current Outpatient Medications:     aspirin 81 MG chewable tablet, Chew 1 tablet Daily., Disp: , Rfl:     carvedilol (COREG) 12.5 MG tablet, TAKE 1 TABLET BY MOUTH TWICE A DAY, Disp: 180 tablet, Rfl: 3    cephalexin (KEFLEX) 500 MG capsule, 1 capsule 2 (Two) Times a Day., Disp: , Rfl:     citalopram (CeleXA) 40 MG tablet, TAKE 1 TABLET BY  "MOUTH DAILY, Disp: 90 tablet, Rfl: 0    eplerenone (INSPRA) 25 MG tablet, TAKE 1/2 TABLET BY MOUTH DAILY, Disp: 15 tablet, Rfl: 3    ezetimibe (ZETIA) 10 MG tablet, Take 1 tablet by mouth Daily., Disp: 90 tablet, Rfl: 3    furosemide (LASIX) 40 MG tablet, TAKE 1 TABLET BY MOUTH DAILY, Disp: 90 tablet, Rfl: 0    losartan (COZAAR) 100 MG tablet, TAKE 1 TABLET BY MOUTH DAILY, Disp: 30 tablet, Rfl: 11    rosuvastatin (CRESTOR) 20 MG tablet, Take 1 tablet by mouth Daily., Disp: 90 tablet, Rfl: 3    Sotagliflozin (Inpefa) 200 MG tablet, Take 1 tablet by mouth Daily., Disp: 90 tablet, Rfl: 3    Allergies: No Known Allergies      Objective      Vital Signs:   Vitals:    02/17/25 0924   BP: 138/98   Weight: 103 kg (227 lb 1.2 oz)   Height: 180.3 cm (70.98\")       Ortho Exam:    Palmar laceration is healing well.    Full range of motion of the thumb MCPJ and IPJ right    Full range of motion of the index finger MCPJ, PIPJ and DIPJ  right    Full range of motion of the middle finger MCPJ, PIPJ and DIPJ right    Full range of motion of the ring finger MCPJ, PIPJ and DIPJ right    Full range of motion of the small finger MCPJ, PIPJ and DIPJ right    FDS, FDP and extensor tendons are intact in the index, middle, ring and small fingers right    FPJ and extensor tendons are intact in the thumb.    No palpable triggering      Results Review:  XR Hand 3+ View Right  Narrative: XR HAND 3+ VW RIGHT    Date of Exam: 1/23/2025 11:20 AM EST    Indication: R hand laceration    Comparison: None available.    Findings:  3 views of the right hand were obtained. No acute fracture or dislocation is visualized. There are degenerative changes throughout the hand which are most prominent at the first carpometacarpal and first MCP joints. There are no periarticular erosions.   The soft tissues are unremarkable.  Impression: Impression:  Degenerative changes of the right hand. No acute osseous abnormality.    Electronically Signed: Wen Thompson MD "    1/23/2025 11:39 AM EST    Workstation ID: [id number]       XR Hand 3+ View Right    Result Date: 1/23/2025  Impression: Degenerative changes of the right hand. No acute osseous abnormality. Electronically Signed: Wen Thompson MD  1/23/2025 11:39 AM EST  Workstation ID: [id number]        Assessment / Plan      Assessment:   Diagnoses and all orders for this visit:    1. Superficial laceration of hand, right, subsequent encounter (Primary)        Quality Metrics:   BMI:   BMI is >= 30 and <35. (Class 1 Obesity). The following options were offered after discussion;: Information on healthy weight added to patient's after visit summary.       Tobacco:   Bj Pichardo  reports that he has never smoked. He has never been exposed to tobacco smoke. He has never used smokeless tobacco.      Plan:  Right hand laceration.  Sutures removed, steri strips applied over the incision.  The incision is healing well.  He will avoid any soaking the hand for another 1 to 2 weeks.  RTC prn.      Amarilis Cabrera PA-C  [facility]    Dictated using Dragon Speech Recognition.

## 2025-02-28 DIAGNOSIS — F32.9 REACTIVE DEPRESSION: ICD-10-CM

## 2025-02-28 RX ORDER — CITALOPRAM HYDROBROMIDE 40 MG/1
40 TABLET ORAL DAILY
Qty: 90 TABLET | Refills: 0 | Status: SHIPPED | OUTPATIENT
Start: 2025-02-28

## 2025-03-12 DIAGNOSIS — I51.9 SYSTOLIC DYSFUNCTION: ICD-10-CM

## 2025-03-12 DIAGNOSIS — I51.7 ENLARGED HEART: ICD-10-CM

## 2025-03-12 DIAGNOSIS — I20.89 ANGINA OF EFFORT: ICD-10-CM

## 2025-03-12 DIAGNOSIS — I50.20 HFREF (HEART FAILURE WITH REDUCED EJECTION FRACTION): ICD-10-CM

## 2025-03-12 DIAGNOSIS — E78.00 PURE HYPERCHOLESTEROLEMIA: Primary | ICD-10-CM

## 2025-03-12 DIAGNOSIS — I49.3 PVC (PREMATURE VENTRICULAR CONTRACTION): ICD-10-CM

## 2025-03-12 DIAGNOSIS — I42.0 DILATED CARDIOMYOPATHY: ICD-10-CM

## 2025-03-13 RX ORDER — EZETIMIBE 10 MG/1
10 TABLET ORAL DAILY
Qty: 90 TABLET | Refills: 3 | Status: SHIPPED | OUTPATIENT
Start: 2025-03-13

## 2025-03-13 RX ORDER — ROSUVASTATIN CALCIUM 20 MG/1
20 TABLET, COATED ORAL DAILY
Qty: 90 TABLET | Refills: 3 | Status: SHIPPED | OUTPATIENT
Start: 2025-03-13

## 2025-03-13 RX ORDER — EPLERENONE 25 MG/1
12.5 TABLET ORAL DAILY
Qty: 45 TABLET | Refills: 0 | Status: SHIPPED | OUTPATIENT
Start: 2025-03-13

## 2025-03-13 NOTE — TELEPHONE ENCOUNTER
Per Dr. Yoder HONORIO 1/27/25 continue inspra, crestor, zetia.  Lab Results   Component Value Date    GLUCOSE 107 (H) 01/27/2025    BUN 13 01/27/2025    CREATININE 1.14 01/27/2025     01/27/2025    K 3.8 01/27/2025     01/27/2025    CALCIUM 9.1 01/27/2025    PROTEINTOT 7.4 01/27/2025    ALBUMIN 4.3 01/27/2025    ALT 48 (H) 01/27/2025    AST 31 01/27/2025    ALKPHOS 82 01/27/2025    BILITOT 1.2 01/27/2025    GLOB 3.1 01/27/2025    AGRATIO 1.4 01/27/2025    BCR 11.4 01/27/2025    ANIONGAP 11.0 01/27/2025    EGFR 77.4 01/27/2025     Lab Results   Component Value Date    CHOL 117 01/27/2025    TRIG 199 (H) 01/27/2025    HDL 33 (L) 01/27/2025    LDL 51 01/27/2025

## 2025-03-16 DIAGNOSIS — I50.20 HFREF (HEART FAILURE WITH REDUCED EJECTION FRACTION): ICD-10-CM

## 2025-03-16 DIAGNOSIS — I42.0 DILATED CARDIOMYOPATHY: ICD-10-CM

## 2025-03-17 RX ORDER — FUROSEMIDE 40 MG/1
40 TABLET ORAL DAILY
Qty: 90 TABLET | Refills: 0 | Status: SHIPPED | OUTPATIENT
Start: 2025-03-17

## 2025-03-18 ENCOUNTER — OFFICE VISIT (OUTPATIENT)
Dept: FAMILY MEDICINE CLINIC | Facility: CLINIC | Age: 55
End: 2025-03-18
Payer: COMMERCIAL

## 2025-03-18 VITALS
OXYGEN SATURATION: 97 % | WEIGHT: 234 LBS | HEIGHT: 71 IN | HEART RATE: 62 BPM | BODY MASS INDEX: 32.76 KG/M2 | SYSTOLIC BLOOD PRESSURE: 134 MMHG | DIASTOLIC BLOOD PRESSURE: 80 MMHG

## 2025-03-18 DIAGNOSIS — E66.811 CLASS 1 OBESITY DUE TO EXCESS CALORIES WITH SERIOUS COMORBIDITY AND BODY MASS INDEX (BMI) OF 32.0 TO 32.9 IN ADULT: Primary | Chronic | ICD-10-CM

## 2025-03-18 DIAGNOSIS — E66.09 CLASS 1 OBESITY DUE TO EXCESS CALORIES WITH SERIOUS COMORBIDITY AND BODY MASS INDEX (BMI) OF 32.0 TO 32.9 IN ADULT: Primary | Chronic | ICD-10-CM

## 2025-03-18 DIAGNOSIS — F32.9 REACTIVE DEPRESSION: ICD-10-CM

## 2025-03-18 PROCEDURE — 99214 OFFICE O/P EST MOD 30 MIN: CPT | Performed by: STUDENT IN AN ORGANIZED HEALTH CARE EDUCATION/TRAINING PROGRAM

## 2025-03-18 NOTE — ASSESSMENT & PLAN NOTE
Per cardiology, LVEF has essentially normalized (50%) with BP in clinic today 134/80. Patient is grossly asymptomatic and is interested in optimizing other aspects of physical and mental health. He is interested in weight loss and we discussed lifestyle modifications including 20-30 minutes of moderate exercise 4-5 times weekly and caloric restriction starting at -500 kcal daily. We also emphasized alcohol cessation and how alcohol can contribute to caloric intake. We will follow-up in 1 month and review a food log ([employer]ily[employer]Pal, Lose It).

## 2025-03-18 NOTE — ASSESSMENT & PLAN NOTE
Per cardiology, LVEF has essentially normalized (50%) with BP in clinic today 134/80. Patient is grossly asymptomatic and is interested in optimizing other aspects of physical and mental health. He is interested in weight loss and we discussed lifestyle modifications including 20-30 minutes of moderate exercise 4-5 times weekly and caloric restriction starting at 500 kcal daily. We also emphasized alcohol cessation and how alcohol can contribute to caloric intake. We will follow-up in 1 month and review a food log (ItsPlatonicPal, Lose It).

## 2025-03-18 NOTE — PROGRESS NOTES
"Chief Complaint   Patient presents with    Hypertension     HPI:  Bj Pichardo is a 54 y.o. male who presents today for hypertension follow-up.    Weight management  Patient states he was diagnosed with dilated cardiomyopathy about 1.5 years ago. He follows with cardiology and reports symptomatic improvement overall. /80 today in clinic. Per last cardiology note, EF has essentially normalized (LVEF 50%). He expresses frustration with being on several medications but can tell that the medications are working. Denies SOA for over a year, now only endorsing dyspnea after 5-6 miles of walking. Denies CP, orthopnea, SOA (all resolved since starting GDMT). He states he feels less energetic, stiff, and generally worn down. He states he is unhappy with his weight and has been working to lose weight recently. Patient states he has been using an elliptical for exercise and working to increase activity. Working to cut out processed/boxed foods, is very conscious about what he eats with occasional lapses. Drinking lots of water and cucumber juice. States he is cutting back on junk food, notably chinese. He states he has returned to drinking alcohol in smaller quantities than before. He states he used to take his dog out for more frequent walks which could be a good source of exercise. Is interested in dietitian services but expressed financial concerns.    Mood  Patient has had several recent stressors including his mother being diagnosed with Alzheimer's, a friend dying by suicide, his band breaking up, and then recent medical issues. He endorses struggling with motivation, energy, and sociability. He states that the citalopram has helped stabilize his mood. He states that the alcohol can help with his mood, but states he has an \"addictive personality\" and wants to cut back more. He is hesitant about starting therapy, but has a family friend he could call who is a therapist. He also states there is a pool mcintyre " near his house that he has never been to, but thinks could be a fun activity.    PE:  Vitals:    03/18/25 0912   BP: 134/80   Pulse: 62   SpO2: 97%      Body mass index is 32.65 kg/m².    Physical Exam  Vitals reviewed.   Constitutional:       General: He is not in acute distress.     Appearance: Normal appearance.   HENT:      Head: Normocephalic and atraumatic.      Nose: Nose normal.      Mouth/Throat:      Mouth: Mucous membranes are moist.      Pharynx: Oropharynx is clear.   Eyes:      Extraocular Movements: Extraocular movements intact.   Cardiovascular:      Rate and Rhythm: Normal rate and regular rhythm.      Pulses: Normal pulses.      Heart sounds: Normal heart sounds.   Pulmonary:      Effort: Pulmonary effort is normal.      Breath sounds: Normal breath sounds.   Abdominal:      General: Abdomen is flat.      Palpations: Abdomen is soft.   Musculoskeletal:         General: Normal range of motion.      Cervical back: Neck supple.      Right lower leg: No edema.      Left lower leg: No edema.   Skin:     General: Skin is warm and dry.      Capillary Refill: Capillary refill takes less than 2 seconds.   Neurological:      General: No focal deficit present.      Mental Status: He is alert and oriented to person, place, and time.   Psychiatric:         Mood and Affect: Mood normal.         Behavior: Behavior normal.         Thought Content: Thought content normal.         Judgment: Judgment normal.       Current Outpatient Medications   Medication Sig Dispense Refill    aspirin 81 MG chewable tablet Chew 1 tablet Daily.      carvedilol (COREG) 12.5 MG tablet TAKE 1 TABLET BY MOUTH TWICE A  tablet 3    cephalexin (KEFLEX) 500 MG capsule 1 capsule 2 (Two) Times a Day.      citalopram (CeleXA) 40 MG tablet TAKE 1 TABLET BY MOUTH DAILY 90 tablet 0    eplerenone (INSPRA) 25 MG tablet TAKE 1/2 TABLET BY MOUTH DAILY 45 tablet 0    ezetimibe (ZETIA) 10 MG tablet TAKE 1 TABLET BY MOUTH DAILY 90 tablet 3     furosemide (LASIX) 40 MG tablet TAKE 1 TABLET BY MOUTH DAILY 90 tablet 0    losartan (COZAAR) 100 MG tablet TAKE 1 TABLET BY MOUTH DAILY 30 tablet 11    rosuvastatin (CRESTOR) 20 MG tablet TAKE 1 TABLET BY MOUTH DAILY 90 tablet 3    Sotagliflozin (Inpefa) 200 MG tablet Take 1 tablet by mouth Daily. 90 tablet 3     No current facility-administered medications for this visit.     Assessment & Plan  Class 1 obesity due to excess calories with serious comorbidity and body mass index (BMI) of 32.0 to 32.9 in adult  Per cardiology, LVEF has essentially normalized (50%) with BP in clinic today 134/80. Patient is grossly asymptomatic and is interested in optimizing other aspects of physical and mental health. He is interested in weight loss and we discussed lifestyle modifications including 20-30 minutes of moderate exercise 4-5 times weekly and caloric restriction starting at -500 kcal daily. We also emphasized alcohol cessation and how alcohol can contribute to caloric intake. We will follow-up in 1 month and review a food log ([employer]xPal, Lose It).       Reactive depression  Most likely diagnosis at this time is major depressive disorder vs reactive depression. Patient is currently on 40 mg citalopram and is not interested in increasing/changing medications. We discussed the role of psychotherapy in assisting with depression and anxiety symptoms. We strongly encouraged small changes throughout the week to improve mood as well as physical activity.    Specific goals:  - Take your dog on a walk outside  - Go to the pool mcintyre to hang out in public  - Call your therapist friend         Return in about 1 month (around 4/18/2025).      Terrance Willingham MS3    I have seen and evaluated the patient with the medical student. I agree with the physical exam findings, assessment and plan as above. I personally performed a separate physical exam on the patient.     Olesya Kern, DO     I spent over 30 minutes caring for Bj on  this date of service. This time includes time spent by me in the following activities: preparing for the visit, reviewing tests, performing a medically appropriate examination and/or evaluation, counseling and educating the patient/family/caregiver, and documenting information in the medical record

## 2025-03-18 NOTE — ASSESSMENT & PLAN NOTE
Most likely diagnosis at this time is major depressive disorder vs reactive depression. Patient is currently on 40 mg citalopram and is not interested in increasing/changing medications. We discussed the role of psychotherapy in assisting with depression and anxiety symptoms. We strongly encouraged small changes throughout the week to improve mood as well as physical activity.    Specific goals:  - Take your dog on a walk outside  - Go to the pool mcintyre to hang out in public  - Call your therapist friend

## 2025-04-21 ENCOUNTER — OFFICE VISIT (OUTPATIENT)
Dept: FAMILY MEDICINE CLINIC | Facility: CLINIC | Age: 55
End: 2025-04-21
Payer: COMMERCIAL

## 2025-04-21 VITALS
WEIGHT: 234 LBS | SYSTOLIC BLOOD PRESSURE: 132 MMHG | HEART RATE: 74 BPM | HEIGHT: 71 IN | OXYGEN SATURATION: 96 % | BODY MASS INDEX: 32.76 KG/M2 | DIASTOLIC BLOOD PRESSURE: 84 MMHG

## 2025-04-21 DIAGNOSIS — F32.9 REACTIVE DEPRESSION: ICD-10-CM

## 2025-04-21 DIAGNOSIS — E66.811 CLASS 1 OBESITY WITH SERIOUS COMORBIDITY AND BODY MASS INDEX (BMI) OF 32.0 TO 32.9 IN ADULT, UNSPECIFIED OBESITY TYPE: Primary | ICD-10-CM

## 2025-04-21 PROCEDURE — 99214 OFFICE O/P EST MOD 30 MIN: CPT | Performed by: STUDENT IN AN ORGANIZED HEALTH CARE EDUCATION/TRAINING PROGRAM

## 2025-04-21 NOTE — PROGRESS NOTES
"Chief Complaint   Patient presents with    Class 1 obesity due to excess calories with serious comorbi     Follow up       HPI:  Bj Pichardo is a 54 y.o. male who presents today for weight management.    BMI 32-patient was seen 6 weeks ago for weight gain.  We discussed keeping food diary, using calorie tracker on his phone and trying to be more physically active.  Weight today unchanged from last visit at 234.  States he is thinking about joining a gym.  He knows he could do better on his diet.  Has not really been tracking his intake.  Orders takeout Chinese a lot.  He states that he is only drinking 1-2 beers \"here and there.\" Not on a daily basis. Realizes that alcohol use likely lead to his cardiac issues and knows he shouldn't drink at all. He has considered pharmacotherapy to help with weight loss but would like to know more about it.     Depression- Mood symptoms have been \"OK.\" Still struggles with low motivation but doesn't feel that it interferes with work, sleep or daily activity. 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    PE:  Vitals:    04/21/25 0902   BP: 132/84   Pulse: 74   SpO2: 96%      Body mass index is 32.65 kg/m².    Gen Appearance: NAD  HEENT: Normocephalic, EOMI, no thyromegaly, trachea midline  Heart: RRR, normal S1 and S2, no murmur  Lungs: CTA b/l, no wheezing, no crackles  MSK: Moves all extremities well, normal gait, no peripheral edema  Neuro: No focal deficits    Current Outpatient Medications   Medication Sig Dispense Refill    aspirin 81 MG chewable tablet Chew 1 tablet Daily.      carvedilol (COREG) 12.5 MG tablet TAKE 1 TABLET BY MOUTH TWICE A  tablet 3    cephalexin (KEFLEX) 500 MG capsule 1 capsule 2 (Two) Times a Day.      citalopram (CeleXA) 40 MG tablet TAKE 1 TABLET BY MOUTH DAILY 90 tablet 0    eplerenone (INSPRA) 25 MG " tablet TAKE 1/2 TABLET BY MOUTH DAILY 45 tablet 0    ezetimibe (ZETIA) 10 MG tablet TAKE 1 TABLET BY MOUTH DAILY 90 tablet 3    furosemide (LASIX) 40 MG tablet TAKE 1 TABLET BY MOUTH DAILY 90 tablet 0    losartan (COZAAR) 100 MG tablet TAKE 1 TABLET BY MOUTH DAILY 30 tablet 11    rosuvastatin (CRESTOR) 20 MG tablet TAKE 1 TABLET BY MOUTH DAILY 90 tablet 3    Sotagliflozin (Inpefa) 200 MG tablet Take 1 tablet by mouth Daily. 90 tablet 3     No current facility-administered medications for this visit.        A/P:  Assessment & Plan  Class 1 obesity with serious comorbidity and body mass index (BMI) of 32.0 to 32.9 in adult, unspecified obesity type  Patient's (Body mass index is 32.65 kg/m².) indicates that they are obese (BMI >30) with health conditions that include hypertension and dyslipidemias . Weight is unchanged. BMI  is above average; BMI management plan is completed. We discussed portion control, increasing exercise, and pharmacologic options including GLP-1 agonist therapy . I think this would be beneficial from a cardiac standpoint as well. He will check with insurance on coverage. In the meantime, I have encouraged him to join the gym or begin daily exercise routine. Work on healthy dietary changes. FU in 6 weeks.          Reactive depression  Pt feels mood symptoms are manageable for now- not interfering with daily activity  Would like to remain on current dose of Celexa  I'm hopeful that with exercise, potentially weight loss mood will improve as well           Return in about 6 weeks (around 6/2/2025) for Weight management.     Dictated Utilizing Dragon Dictation    Please note that portions of this note were completed with a voice recognition program.    Part of this note may be an electronic transcription/translation of spoken language to printed text using the Dragon Dictation System.

## 2025-04-21 NOTE — ASSESSMENT & PLAN NOTE
Pt feels mood symptoms are manageable for now- not interfering with daily activity  Would like to remain on current dose of Celexa  I'm hopeful that with exercise, potentially weight loss mood will improve as well

## 2025-06-03 ENCOUNTER — OFFICE VISIT (OUTPATIENT)
Dept: FAMILY MEDICINE CLINIC | Facility: CLINIC | Age: 55
End: 2025-06-03
Payer: COMMERCIAL

## 2025-06-03 VITALS
SYSTOLIC BLOOD PRESSURE: 122 MMHG | OXYGEN SATURATION: 96 % | WEIGHT: 236.2 LBS | HEIGHT: 71 IN | DIASTOLIC BLOOD PRESSURE: 86 MMHG | BODY MASS INDEX: 33.07 KG/M2 | HEART RATE: 70 BPM

## 2025-06-03 DIAGNOSIS — E66.811 CLASS 1 OBESITY DUE TO EXCESS CALORIES WITH SERIOUS COMORBIDITY AND BODY MASS INDEX (BMI) OF 32.0 TO 32.9 IN ADULT: Chronic | ICD-10-CM

## 2025-06-03 DIAGNOSIS — I42.0 DILATED CARDIOMYOPATHY: ICD-10-CM

## 2025-06-03 DIAGNOSIS — E66.09 CLASS 1 OBESITY DUE TO EXCESS CALORIES WITH SERIOUS COMORBIDITY AND BODY MASS INDEX (BMI) OF 32.0 TO 32.9 IN ADULT: Chronic | ICD-10-CM

## 2025-06-03 DIAGNOSIS — G89.29 CHRONIC BILATERAL LOW BACK PAIN WITHOUT SCIATICA: Primary | ICD-10-CM

## 2025-06-03 DIAGNOSIS — M54.50 CHRONIC BILATERAL LOW BACK PAIN WITHOUT SCIATICA: Primary | ICD-10-CM

## 2025-06-03 PROCEDURE — 99214 OFFICE O/P EST MOD 30 MIN: CPT | Performed by: STUDENT IN AN ORGANIZED HEALTH CARE EDUCATION/TRAINING PROGRAM

## 2025-06-03 NOTE — PROGRESS NOTES
Chief Complaint   Patient presents with    Class 1 obesity with serious comorbidity and body mass inde     Follow up       HPI:  Bj Pichardo is a 54 y.o. male who presents today for weight check.     Weight today is 236 lbs, was 234 at last visit. Not on any pharmacotherapy, has been trying to lose with diet/exercise. States he's trying to eat better. Weight has been stable, happy that he's not gaining. Not exercising regularly.   He has been making an effort to do more things. He hung out with friends on Saturday night playing music. Mood is OK.     Having some persistent low back pain which he reported at visit a couple of months ago as well but opted not to seek any imaging or treatment at that time. Pain isnt worse but not better either. Hurts across the whole low back. No LE radiation, no numbness or tingling. N inciting injury.       PE:  Vitals:    06/03/25 0931   BP: 122/86   Pulse: 70   SpO2: 96%      Body mass index is 32.96 kg/m².    Gen Appearance: NAD  HEENT: Normocephalic, EOMI, no thyromegaly, trachea midline  Heart: RRR, normal S1 and S2, no murmur  Lungs: CTA b/l, no wheezing, no crackles  MSK: Moves all extremities well, normal gait, no peripheral edema  Neuro: No focal deficits    Current Outpatient Medications   Medication Sig Dispense Refill    aspirin 81 MG chewable tablet Chew 1 tablet Daily.      carvedilol (COREG) 12.5 MG tablet TAKE 1 TABLET BY MOUTH TWICE A  tablet 3    citalopram (CeleXA) 40 MG tablet TAKE 1 TABLET BY MOUTH DAILY 90 tablet 0    eplerenone (INSPRA) 25 MG tablet TAKE 1/2 TABLET BY MOUTH DAILY 45 tablet 0    ezetimibe (ZETIA) 10 MG tablet TAKE 1 TABLET BY MOUTH DAILY 90 tablet 3    furosemide (LASIX) 40 MG tablet TAKE 1 TABLET BY MOUTH DAILY 90 tablet 0    losartan (COZAAR) 100 MG tablet TAKE 1 TABLET BY MOUTH DAILY 30 tablet 11    rosuvastatin (CRESTOR) 20 MG tablet TAKE 1 TABLET BY MOUTH DAILY 90 tablet 3    Sotagliflozin (Inpefa) 200 MG tablet Take 1  tablet by mouth Daily. 90 tablet 3     No current facility-administered medications for this visit.        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  Will check XR today  Orders:    XR Spine Lumbar 2 or 3 View; Future    Class 1 obesity due to excess calories with serious comorbidity and body mass index (BMI) of 32.0 to 32.9 in adult  Weight unchanged  Pt states he will cont to work on improving diet. I have strongly recommended that he increase his exercise as I think this would benefit him most from a weight loss standpoint as well as cardiac and mood benefits.        Dilated cardiomyopathy  Following w Cardiology  Cont GDMT               Return in about 3 months (around 9/3/2025) for Est w new PCP 3-6 month FU.     Dictated Utilizing Dragon Dictation    Please note that portions of this note were completed with a voice recognition program.    Part of this note may be an electronic transcription/translation of spoken language to printed text using the Dragon Dictation System.

## 2025-06-03 NOTE — ASSESSMENT & PLAN NOTE
Weight unchanged  Pt states he will cont to work on improving diet. I have strongly recommended that he increase his exercise as I think this would benefit him most from a weight loss standpoint as well as cardiac and mood benefits.

## 2025-06-27 DIAGNOSIS — F32.9 REACTIVE DEPRESSION: ICD-10-CM

## 2025-07-03 RX ORDER — CITALOPRAM HYDROBROMIDE 40 MG/1
40 TABLET ORAL DAILY
Qty: 30 TABLET | Refills: 1 | Status: SHIPPED | OUTPATIENT
Start: 2025-07-03

## 2025-07-03 NOTE — TELEPHONE ENCOUNTER
Caller: Bj Pichardo Frederick    Relationship: Self    Best call back number:  [phone] (Home)     Requested Prescriptions:   Requested Prescriptions     Pending Prescriptions Disp Refills    citalopram (CeleXA) 40 MG tablet [Pharmacy Med Name: CITALOPRAM HBR 40 MG TABLET] 30 tablet      Sig: TAKE 1 TABLET BY MOUTH DAILY      Pharmacy where request should be sent: [facility] [id number] [address]  AT [facility] - [phone]  - [phone] FX     Last office visit with prescribing clinician: 6/3/2025   Last telemedicine visit with prescribing clinician: Visit date not found   Next office visit with prescribing clinician: Visit date not found     Additional details provided by patient:  PATIENT HAS BEEN OUT OF THE MEDICATION FOR 4 DAYS   HE HAS AN APPOINTMENT TO ESTABLISH CARE WITH DR BROWN ON 9-30-25 AT 10:30     DR MELARA HAD SAID IF HE GETS ON THE SCHEDULE THEN THE MEDICATION COULD BE SENT IN     Does the patient have less than a 3 day supply:  [x] Yes  [] No    Would you like a call back once the refill request has been completed: [] Yes [x] No    If the office needs to give you a call back, can they leave a voicemail: [] Yes [x] No

## 2025-07-10 DIAGNOSIS — I51.9 SYSTOLIC DYSFUNCTION: ICD-10-CM

## 2025-07-10 DIAGNOSIS — I49.3 PVC (PREMATURE VENTRICULAR CONTRACTION): ICD-10-CM

## 2025-07-10 DIAGNOSIS — I42.0 DILATED CARDIOMYOPATHY: ICD-10-CM

## 2025-07-10 DIAGNOSIS — I20.89 ANGINA OF EFFORT: ICD-10-CM

## 2025-07-10 DIAGNOSIS — I51.7 ENLARGED HEART: ICD-10-CM

## 2025-07-10 DIAGNOSIS — I50.20 HFREF (HEART FAILURE WITH REDUCED EJECTION FRACTION): ICD-10-CM

## 2025-07-10 DIAGNOSIS — E78.00 PURE HYPERCHOLESTEROLEMIA: ICD-10-CM

## 2025-07-10 RX ORDER — EPLERENONE 25 MG/1
TABLET ORAL
Qty: 15 TABLET | Refills: 1 | Status: SHIPPED | OUTPATIENT
Start: 2025-07-10

## 2025-07-10 NOTE — TELEPHONE ENCOUNTER
Per Dr. Yoder HONORIO 1/27/25 continue inspra   Lab Results   Component Value Date    GLUCOSE 107 (H) 01/27/2025    BUN 13 01/27/2025    CREATININE 1.14 01/27/2025     01/27/2025    K 3.8 01/27/2025     01/27/2025    CALCIUM 9.1 01/27/2025    PROTEINTOT 7.4 01/27/2025    ALBUMIN 4.3 01/27/2025    ALT 48 (H) 01/27/2025    AST 31 01/27/2025    ALKPHOS 82 01/27/2025    BILITOT 1.2 01/27/2025    GLOB 3.1 01/27/2025    AGRATIO 1.4 01/27/2025    BCR 11.4 01/27/2025    ANIONGAP 11.0 01/27/2025    EGFR 77.4 01/27/2025

## 2025-07-18 DIAGNOSIS — I50.20 HFREF (HEART FAILURE WITH REDUCED EJECTION FRACTION): ICD-10-CM

## 2025-07-18 DIAGNOSIS — I42.0 DILATED CARDIOMYOPATHY: ICD-10-CM

## 2025-07-18 RX ORDER — FUROSEMIDE 40 MG/1
40 TABLET ORAL DAILY
Qty: 90 TABLET | Refills: 0 | Status: SHIPPED | OUTPATIENT
Start: 2025-07-18